# Patient Record
Sex: FEMALE | NOT HISPANIC OR LATINO | Employment: OTHER | URBAN - METROPOLITAN AREA
[De-identification: names, ages, dates, MRNs, and addresses within clinical notes are randomized per-mention and may not be internally consistent; named-entity substitution may affect disease eponyms.]

---

## 2017-03-20 ENCOUNTER — TRANSFERRED RECORDS (OUTPATIENT)
Dept: HEALTH INFORMATION MANAGEMENT | Facility: CLINIC | Age: 72
End: 2017-03-20

## 2017-12-14 ENCOUNTER — PRE VISIT (OUTPATIENT)
Dept: CARDIOLOGY | Facility: CLINIC | Age: 72
End: 2017-12-14

## 2017-12-19 ENCOUNTER — OFFICE VISIT (OUTPATIENT)
Dept: CARDIOLOGY | Facility: CLINIC | Age: 72
End: 2017-12-19
Attending: INTERNAL MEDICINE
Payer: COMMERCIAL

## 2017-12-19 VITALS
HEIGHT: 61 IN | SYSTOLIC BLOOD PRESSURE: 118 MMHG | BODY MASS INDEX: 24.35 KG/M2 | DIASTOLIC BLOOD PRESSURE: 76 MMHG | HEART RATE: 60 BPM | WEIGHT: 129 LBS

## 2017-12-19 DIAGNOSIS — I25.10 CORONARY ARTERY DISEASE INVOLVING NATIVE CORONARY ARTERY OF NATIVE HEART WITHOUT ANGINA PECTORIS: Primary | ICD-10-CM

## 2017-12-19 DIAGNOSIS — I21.4 NSTEMI (NON-ST ELEVATED MYOCARDIAL INFARCTION) (H): ICD-10-CM

## 2017-12-19 DIAGNOSIS — I10 ESSENTIAL HYPERTENSION: ICD-10-CM

## 2017-12-19 DIAGNOSIS — Z95.5 S/P RIGHT CORONARY ARTERY (RCA) STENT PLACEMENT: ICD-10-CM

## 2017-12-19 DIAGNOSIS — E78.00 PURE HYPERCHOLESTEROLEMIA: ICD-10-CM

## 2017-12-19 PROCEDURE — 99214 OFFICE O/P EST MOD 30 MIN: CPT | Performed by: INTERNAL MEDICINE

## 2017-12-19 NOTE — LETTER
"12/19/2017      Minesh Conte MD  [facility] [address]  [facility] [address]      RE: Malinda M Chapincito       Dear Colleague,    I had the pleasure of seeing Malinda Beckham in the [facility].    PRIMARY CARE PHYSICIAN:  Dr. Minesh Conte.      HISTORY OF PRESENT ILLNESS:  I again had the pleasure of seeing your patient, Malinda Beckham, at [facility] for evaluation of coronary artery disease and intermittent chest discomfort.  The patient was hospitalized at [facility] 10/18/2013 for chest discomfort and throat burning.  Coronary angiography demonstrated a 95% proximal right coronary artery stenosis that was stented using a drug-eluting stent.  A 60%-70% long proximal LAD stenosis and a 50% mid-circumflex artery stenosis were untreated.  Nuclear stress tests performed in 2014 and 2015 demonstrated no evidence of ischemia or infarction with well-maintained ejection fraction.  The patient continues to have intermittent chest discomfort and was hospitalized in Observation on 10/23/2015.  Her nuclear stress test was normal and a 24-hour ambulatory blood pressure monitor and Holter monitor were performed.  The patient had PVCs and PACs but no significant tachy or bradyarrhythmias.  A 24-hour ambulatory blood pressure monitor showed approximately 20% of the systolic readings elevated and 80% normal.  The patient's chest discomfort continues to be 30 seconds or less.  She denies surgery or hospitalizations since I saw her 1 year ago.  She has taken sublingual nitroglycerin for \"a fast heart rate.\"  This has occurred twice with heart rates around 100 beats per minute.  This lasts 30 minutes.  It seems to be relieved with sitting down and the nitroglycerin.  She is not able to exercise due to arthritis.      PHYSICAL EXAMINATION:  As listed below.      LABORATORY:  From Dr. Conte's office includes on 03/20/2017 total " cholesterol 145, triglycerides 74, HDL 97, LDL 43, VLDL 15 with a ratio of 1.7.  Glucose 91.      ASSESSMENT:   1.  Malinda Beckham is a pleasant 72-year-old female status post non-ST elevation inferior wall myocardial infarction in 10/2013.  Her nuclear stress test the next 2 years were normal with normal ejection fraction.  We will continue to monitor.  I would not perform another stress test on her for 2-3 more years if she remains stable.  Her chest pain is undoubtedly noncardiac as it occurs at rest.   2.  The patient's blood pressure is normal.  I have made no medication changes.   3.  Fasting lipid profile which is under excellent control and followed by her primary care physician.      It is my pleasure to assist in the care of Malinda Beckham.  All her questions were answered to her satisfaction.       Outpatient Encounter Prescriptions as of 12/19/2017   Medication Sig Dispense Refill     cycloSPORINE (RESTASIS) 0.05 % ophthalmic emulsion Place 1 drop into both eyes 2 times daily       Naproxen Sodium (ALEVE PO) Take 550 mg by mouth        Biotin 1 MG CAPS Take by mouth daily       cyanocobalamin 1000 MCG SUBL Place 1,000 mcg under the tongue daily       Ascorbic Acid (VITAMIN C PO) Take 500 mg by mouth daily        VITAMIN E PO Take 400 Units by mouth daily        Omega-3 Fatty Acids (OMEGA-3 FISH OIL PO) Take 1 g by mouth daily        Coenzyme Q10 (COQ-10 PO) Take 1 tablet by mouth daily       melatonin 5 MG tablet Take 5 mg by mouth every evening       MAGNESIUM PO Take 1 tablet by mouth daily       CALCIUM-VITAMIN D PO Take 1 tablet by mouth daily       nitroglycerin (NITROSTAT) 0.4 MG SL tablet Place 1 tablet (0.4 mg) under the tongue every 5 minutes as needed for chest pain 25 tablet 0     metoprolol (LOPRESSOR) 50 MG tablet Take 25 mg by mouth 2 times daily  60 tablet      atorvastatin (LIPITOR) 40 MG tablet Take 40 mg by mouth every evening        aspirin 81 MG EC tablet Take 1 tablet (81 mg) by mouth  daily 30 tablet 12     [DISCONTINUED] naproxen sodium (ANAPROX) 220 MG tablet Take 440 mg by mouth daily        [DISCONTINUED] prednisoLONE acetate (PRED FORTE) 1 % ophthalmic suspension Place 1 drop into the right eye 4 times daily 1 Bottle      No facility-administered encounter medications on file as of 12/19/2017.      Again, thank you for allowing me to participate in the care of your patient.      Sincerely,    Michael Wright MD     [facility]

## 2017-12-19 NOTE — PROGRESS NOTES
HPI and Plan:   See dictation:[id number]    Orders Placed This Encounter   Procedures     Follow-Up with Cardiologist       No orders of the defined types were placed in this encounter.      Medications Discontinued During This Encounter   Medication Reason     naproxen sodium (ANAPROX) 220 MG tablet Medication Reconciliation Clean Up     prednisoLONE acetate (PRED FORTE) 1 % ophthalmic suspension Not filled/taken by Patient         Encounter Diagnoses   Name Primary?     Coronary artery disease involving native coronary artery of native heart without angina pectoris      NSTEMI (non-ST elevated myocardial infarction) (H)      Essential hypertension      Pure hypercholesterolemia        CURRENT MEDICATIONS:  Current Outpatient Prescriptions   Medication Sig Dispense Refill     cycloSPORINE (RESTASIS) 0.05 % ophthalmic emulsion Place 1 drop into both eyes 2 times daily       Naproxen Sodium (ALEVE PO) Take 550 mg by mouth        Biotin 1 MG CAPS Take by mouth daily       cyanocobalamin 1000 MCG SUBL Place 1,000 mcg under the tongue daily       Ascorbic Acid (VITAMIN C PO) Take 500 mg by mouth daily        VITAMIN E PO Take 400 Units by mouth daily        Omega-3 Fatty Acids (OMEGA-3 FISH OIL PO) Take 1 g by mouth daily        Coenzyme Q10 (COQ-10 PO) Take 1 tablet by mouth daily       melatonin 5 MG tablet Take 5 mg by mouth every evening       MAGNESIUM PO Take 1 tablet by mouth daily       CALCIUM-VITAMIN D PO Take 1 tablet by mouth daily       nitroglycerin (NITROSTAT) 0.4 MG SL tablet Place 1 tablet (0.4 mg) under the tongue every 5 minutes as needed for chest pain 25 tablet 0     metoprolol (LOPRESSOR) 50 MG tablet Take 25 mg by mouth 2 times daily  60 tablet      atorvastatin (LIPITOR) 40 MG tablet Take 40 mg by mouth every evening        aspirin 81 MG EC tablet Take 1 tablet (81 mg) by mouth daily 30 tablet 12       ALLERGIES   No Known Allergies    PAST MEDICAL HISTORY:  Past Medical History:   Diagnosis Date      "Coronary artery disease     stent 2013     Hyperlipidaemia      Hypertension      Myocardial infarction     NSTEMI 2013     Osteoarthritis of knee      Pure hypercholesterolemia        PAST SURGICAL HISTORY:  Past Surgical History:   Procedure Laterality Date     HC LEFT HEART CATHETERIZATION  10/18/13    Stent to RCA     lens implant surgery         FAMILY HISTORY:  Family History   Problem Relation Age of Onset     CEREBROVASCULAR DISEASE Mother 79     possible MI     Hypertension Mother      C.A.D. Father 59     MI     C.A.D. Brother      2 stents     C.A.D. Other      MI     CANCER Other 69     lung       SOCIAL HISTORY:  Social History     Social History     Marital status:      Spouse name: N/A     Number of children: N/A     Years of education: N/A     Social History Main Topics     Smoking status: Former Smoker     Smokeless tobacco: Never Used      Comment: 1967     Alcohol use 0.0 oz/week     0 Standard drinks or equivalent per week      Comment: 1-2 a day.     Drug use: No     Sexual activity: Not Asked     Other Topics Concern     Caffeine Concern No     1-2  mug mornings     Sleep Concern No     does not sleep well     Special Diet No     Exercise No     due to knees     Seat Belt Yes     Social History Narrative       Review of Systems:  Skin:  Positive for rash     Eyes:  Positive for glasses    ENT:  Positive for tinnitus    Respiratory:  Negative       Cardiovascular:    palpitations;Positive for;chest pain    Gastroenterology: Negative      Genitourinary:  Positive for urinary frequency    Musculoskeletal:  Positive for joint pain    Neurologic:  Positive for numbness or tingling of hands    Psychiatric:  Positive for sleep disturbances    Heme/Lymph/Imm:  Negative      Endocrine:  Negative        Physical Exam:  Vitals: /76 (BP Location: Right arm, Patient Position: Sitting, Cuff Size: Adult Regular)  Pulse 60  Ht 1.537 m (5' 0.5\")  Wt 58.5 kg (129 lb)  Breastfeeding? No  BMI " 24.78 kg/m2    Constitutional:  cooperative, alert and oriented, well developed, well nourished, in no acute distress        Skin:  warm and dry to the touch, no apparent skin lesions or masses noted          Head:  normocephalic, no masses or lesions        Eyes:  pupils equal and round, conjunctivae and lids unremarkable, sclera white, no xanthalasma, EOMS intact, no nystagmus        Lymph:      ENT:  no pallor or cyanosis, dentition good        Neck:  carotid pulses are full and equal bilaterally, JVP normal, no carotid bruit        Respiratory:  normal symmetry;normal respiratory excursion    fewe rales in left base otherwise clear to auscultation    Cardiac: regular rhythm;normal S1 and S2;apical impulse not displaced   S4   systolic ejection murmur;grade 1;LLSB;radiation to the RUSB        pulses full and equal, no bruits auscultated                                        GI:  abdomen soft, non-tender, BS normoactive, no mass, no HSM, no bruits        Extremities and Muscular Skeletal:  no edema arthritic deformities of LE            Neurological:  affect appropriate   walks with a cane    Psych:  Alert and Oriented x 3        CC  Michael Wright MD  [address]  [place] [address]

## 2017-12-19 NOTE — MR AVS SNAPSHOT
"              After Visit Summary   12/19/2017    Malinda Beckham    MRN: [id number]           Patient Information     Date Of Birth          1945        Visit Information        Provider Department      12/19/2017 3:15 PM Michael Wright MD [facility]        Today's Diagnoses     Coronary artery disease involving native coronary artery of native heart without angina pectoris        NSTEMI (non-ST elevated myocardial infarction) (H)        Essential hypertension        Pure hypercholesterolemia           Follow-ups after your visit        Additional Services     Follow-Up with Cardiologist                 Future tests that were ordered for you today     Open Future Orders        Priority Expected Expires Ordered    Follow-Up with Cardiologist Routine 12/19/2018 12/20/2018 12/19/2017            Who to contact     If you have questions or need follow up information about today's clinic visit or your schedule please contact [facility] directly at [phone].  Normal or non-critical lab and imaging results will be communicated to you by [employer], letter or phone within 4 business days after the clinic has received the results. If you do not hear from us within 7 days, please contact the clinic through [employer] or phone. If you have a critical or abnormal lab result, we will notify you by phone as soon as possible.  Submit refill requests through [employer] or call your pharmacy and they will forward the refill request to us. Please allow 3 business days for your refill to be completed.          Additional Information About Your Visit        MyChart Information     [employer] lets you send messages to your doctor, view your test results, renew your prescriptions, schedule appointments and more. To sign up, go to [email or web address] . Click on \"Log in\" on the left side of the screen, which will take you to the Welcome page. Then " "click on \"Sign up Now\" on the right side of the page.     You will be asked to enter the access code listed below, as well as some personal information. Please follow the directions to create your username and password.     Your access code is: [email or web address]  Expires: 3/19/2018  3:47 PM     Your access code will  in 90 days. If you need help or a new code, please call your [place] clinic or [phone].        Care EveryWhere ID     This is your Care EveryWhere ID. This could be used by other organizations to access your [place] medical records  [id number]        Your Vitals Were     Pulse Height Breastfeeding? BMI (Body Mass Index)          60 1.537 m (5' 0.5\") No 24.78 kg/m2         Blood Pressure from Last 3 Encounters:   17 118/76   16 138/65   12/07/15 148/65    Weight from Last 3 Encounters:   17 58.5 kg (129 lb)   16 59.9 kg (132 lb)   12/07/15 59.4 kg (131 lb)              We Performed the Following     Follow-Up with Cardiologist        Primary Care Provider Office Phone # Fax #    Minesh Conte [phone]       [facility] [address] [facility] [address]        Equal Access to Services     NIC BENNETT : Hadii aad ku hadasho Soyashira, waaxda luqadaha, qaybta kaalmada adeshakeelyada, luz ornelas. So [facility] [phone].    ATENCIÓN: Si habla español, tiene a nicholson disposición servicios gratuitos de asistencia lingüística. Tian al [phone].    We comply with applicable federal civil rights laws and [place] laws. We do not discriminate on the basis of race, color, national origin, age, disability, sex, sexual orientation, or gender identity.            Thank you!     Thank you for choosing [facility]  for your care. Our goal is always to provide you with excellent care. Hearing back from our patients is one way we can continue to improve our services. " Please take a few minutes to complete the written survey that you may receive in the mail after your visit with us. Thank you!             Your Updated Medication List - Protect others around you: Learn how to safely use, store and throw away your medicines at www.disposemymeds.org.          This list is accurate as of: 12/19/17  3:47 PM.  Always use your most recent med list.                   Brand Name Dispense Instructions for use Diagnosis    ALEVE PO      Take 550 mg by mouth        aspirin 81 MG EC tablet     30 tablet    Take 1 tablet (81 mg) by mouth daily    CAD (coronary artery disease)       atorvastatin 40 MG tablet    LIPITOR     Take 40 mg by mouth every evening        Biotin 1 MG Caps      Take by mouth daily        CALCIUM-VITAMIN D PO      Take 1 tablet by mouth daily        COQ-10 PO      Take 1 tablet by mouth daily        cyanocobalamin 1000 MCG Subl sublingual tablet      Place 1,000 mcg under the tongue daily        MAGNESIUM PO      Take 1 tablet by mouth daily        melatonin 5 MG tablet      Take 5 mg by mouth every evening        metoprolol 50 MG tablet    LOPRESSOR    60 tablet    Take 25 mg by mouth 2 times daily        nitroGLYcerin 0.4 MG sublingual tablet    NITROSTAT    25 tablet    Place 1 tablet (0.4 mg) under the tongue every 5 minutes as needed for chest pain    Chest discomfort       OMEGA-3 FISH OIL PO      Take 1 g by mouth daily        RESTASIS 0.05 % ophthalmic emulsion   Generic drug:  cycloSPORINE      Place 1 drop into both eyes 2 times daily        VITAMIN C PO      Take 500 mg by mouth daily        VITAMIN E PO      Take 400 Units by mouth daily

## 2017-12-20 NOTE — PROGRESS NOTES
"PRIMARY CARE PHYSICIAN:  Dr. Minesh Conte.      HISTORY OF PRESENT ILLNESS:  I again had the pleasure of seeing your patient, Malinda Beckham, at [facility] for evaluation of coronary artery disease and intermittent chest discomfort.  The patient was hospitalized at [facility] 10/18/2013 for chest discomfort and throat burning.  Coronary angiography demonstrated a 95% proximal right coronary artery stenosis that was stented using a drug-eluting stent.  A 60%-70% long proximal LAD stenosis and a 50% mid-circumflex artery stenosis were untreated.  Nuclear stress tests performed in 2014 and 2015 demonstrated no evidence of ischemia or infarction with well-maintained ejection fraction.  The patient continues to have intermittent chest discomfort and was hospitalized in Observation on 10/23/2015.  Her nuclear stress test was normal and a 24-hour ambulatory blood pressure monitor and Holter monitor were performed.  The patient had PVCs and PACs but no significant tachy or bradyarrhythmias.  A 24-hour ambulatory blood pressure monitor showed approximately 20% of the systolic readings elevated and 80% normal.  The patient's chest discomfort continues to be 30 seconds or less.  She denies surgery or hospitalizations since I saw her 1 year ago.  She has taken sublingual nitroglycerin for \"a fast heart rate.\"  This has occurred twice with heart rates around 100 beats per minute.  This lasts 30 minutes.  It seems to be relieved with sitting down and the nitroglycerin.  She is not able to exercise due to arthritis.      PHYSICAL EXAMINATION:  As listed below.      LABORATORY:  From Dr. Conte's office includes on 03/20/2017 total cholesterol 145, triglycerides 74, HDL 97, LDL 43, VLDL 15 with a ratio of 1.7.  Glucose 91.      ASSESSMENT:   1.  Malinda Beckham is a pleasant 72-year-old female status post non-ST elevation inferior wall myocardial infarction in 10/2013.  Her nuclear stress test " the next 2 years were normal with normal ejection fraction.  We will continue to monitor.  I would not perform another stress test on her for 2-3 more years if she remains stable.  Her chest pain is undoubtedly noncardiac as it occurs at rest.   2.  The patient's blood pressure is normal.  I have made no medication changes.   3.  Fasting lipid profile which is under excellent control and followed by her primary care physician.      It is my pleasure to assist in the care of Malinda Rodríguez.  All her questions were answered to her satisfaction.      Kelly Gomes MD       cc:   [place] [address]         KELLY GOMES MD, [facility]             D: 2017 15:56   T: 2017 20:41   MT: TARAH      Name:     MALINDA RODRÍGUEZ   MRN:      [id number]        Account:      [id number]   :      1945           Service Date: 2017      Document: [id number]

## 2017-12-29 ENCOUNTER — TELEPHONE (OUTPATIENT)
Dept: CARDIOLOGY | Facility: CLINIC | Age: 72
End: 2017-12-29

## 2017-12-29 NOTE — TELEPHONE ENCOUNTER
Patient called and stated she blew ut her knee yesterday and went to the urgent care.  She was started on Meloxicam but was instructed to call her PMD or cardiologist prior to starting. Informed her Dr. Wright is out of the office this afternoon and suggested she contact her PMD to verify it is ok to start the medication. She stated her understanding. No further questions.

## 2018-01-09 ENCOUNTER — TELEPHONE (OUTPATIENT)
Dept: CARDIOLOGY | Facility: CLINIC | Age: 73
End: 2018-01-09

## 2018-01-09 NOTE — TELEPHONE ENCOUNTER
Patient called and stated she needs to have a knee replacement.  The surgeon called her today and informed her he had a cancellation next week to complete the surgery.  She is wondering if she could use Dr. Wright's 12/19/17 OV as her pre-op visit.     Per Dr. Wright: 1Theo Beckham is a pleasant 72-year-old female status post non-ST elevation inferior wall myocardial infarction in 10/2013.  Her nuclear stress test the next 2 years were normal with normal ejection fraction.  We will continue to monitor.  I would not perform another stress test on her for 2-3 more years if she remains stable.  Her chest pain is undoubtedly noncardiac as it occurs at rest.     Will route to Dr. Wright to see if it is ok to use 12/19/17 as a pre-op visit and if he would want any further testing prior.

## 2018-01-09 NOTE — TELEPHONE ENCOUNTER
Patient called and left a message. Called patient back and she did not answer. Left a message to call team 4 with the direct number.

## 2018-02-13 ENCOUNTER — TRANSFERRED RECORDS (OUTPATIENT)
Dept: HEALTH INFORMATION MANAGEMENT | Facility: CLINIC | Age: 73
End: 2018-02-13

## 2018-02-26 ASSESSMENT — MIFFLIN-ST. JEOR: SCORE: 1055.14

## 2018-03-01 ENCOUNTER — ANESTHESIA - HEALTHEAST (OUTPATIENT)
Dept: SURGERY | Facility: CLINIC | Age: 73
End: 2018-03-01

## 2018-03-02 ENCOUNTER — HOME CARE/HOSPICE - HEALTHEAST (OUTPATIENT)
Dept: HOME HEALTH SERVICES | Facility: HOME HEALTH | Age: 73
End: 2018-03-02

## 2018-03-02 ENCOUNTER — SURGERY - HEALTHEAST (OUTPATIENT)
Dept: SURGERY | Facility: CLINIC | Age: 73
End: 2018-03-02

## 2018-03-02 ASSESSMENT — MIFFLIN-ST. JEOR: SCORE: 991.64

## 2018-04-17 LAB
ALBUMIN SERPL-MCNC: 4.1 G/DL (ref 3.4–5)
ALP SERPL-CCNC: 99 U/L (ref 0–116)
ALT SERPL-CCNC: 28 U/L (ref 0–78)
ANION GAP SERPL CALCULATED.3IONS-SCNC: NORMAL MMOL/L
AST SERPL-CCNC: 27 U/L (ref 0–37)
BILIRUB SERPL-MCNC: 0.51 MG/DL (ref 0.2–1)
BUN SERPL-MCNC: 17 MG/DL (ref 7–18)
CALCIUM SERPL-MCNC: 9.1 MG/DL (ref 8.5–10.1)
CHLORIDE SERPLBLD-SCNC: 104 MMOL/L (ref 98–107)
CHOLEST SERPL-MCNC: 151 MG/DL (ref 0–200)
CO2 SERPL-SCNC: NORMAL MMOL/L
CREAT SERPL-MCNC: 0.69 MG/DL (ref 0.6–1.3)
ERYTHROCYTE [DISTWIDTH] IN BLOOD BY AUTOMATED COUNT: 13.3 % (ref 11.5–14.5)
GFR SERPL CREATININE-BSD FRML MDRD: NORMAL ML/MIN/1.73M2
GLUCOSE SERPL-MCNC: 92 MG/DL (ref 70–99)
HBA1C MFR BLD: 5.1 % (ref 4.8–5.6)
HCT VFR BLD AUTO: 40.2 % (ref 36–45)
HDLC SERPL-MCNC: 71 MG/DL (ref 40–96)
HEMOGLOBIN: 13.2 G/DL (ref 12–15.5)
LDLC SERPL CALC-MCNC: 48.6 MG/DL (ref 0–130)
MCH RBC QN AUTO: 30.2 PG (ref 26.5–33)
MCHC RBC AUTO-ENTMCNC: 32.8 G/DL (ref 31.5–36.5)
MCV RBC AUTO: 92 FL (ref 78–99)
NONHDLC SERPL-MCNC: NORMAL MG/DL
PLATELET # BLD AUTO: 290 10^9/L (ref 150–430)
POTASSIUM SERPL-SCNC: 4.4 MMOL/L (ref 3.5–5.1)
PROT SERPL-MCNC: 7.1 G/DL (ref 6.4–8.2)
RBC # BLD AUTO: 4.37 10^12/L (ref 4–5.2)
SODIUM SERPL-SCNC: 138 MMOL/L (ref 136–145)
TRIGL SERPL-MCNC: 157 MG/DL (ref 30–200)
TSH SERPL-ACNC: 2.49 MCU/ML (ref 0.36–3.74)
WBC # BLD AUTO: 10.1 10^9/L (ref 4–10)

## 2018-12-13 ENCOUNTER — DOCUMENTATION ONLY (OUTPATIENT)
Dept: CARDIOLOGY | Facility: CLINIC | Age: 73
End: 2018-12-13

## 2018-12-17 ENCOUNTER — OFFICE VISIT (OUTPATIENT)
Dept: CARDIOLOGY | Facility: CLINIC | Age: 73
End: 2018-12-17
Payer: COMMERCIAL

## 2018-12-17 VITALS
BODY MASS INDEX: 26.5 KG/M2 | HEART RATE: 60 BPM | WEIGHT: 135 LBS | DIASTOLIC BLOOD PRESSURE: 72 MMHG | HEIGHT: 60 IN | SYSTOLIC BLOOD PRESSURE: 161 MMHG

## 2018-12-17 DIAGNOSIS — Z95.5 S/P RIGHT CORONARY ARTERY (RCA) STENT PLACEMENT: ICD-10-CM

## 2018-12-17 DIAGNOSIS — I10 ESSENTIAL HYPERTENSION: ICD-10-CM

## 2018-12-17 DIAGNOSIS — E78.00 PURE HYPERCHOLESTEROLEMIA: ICD-10-CM

## 2018-12-17 DIAGNOSIS — I25.10 CORONARY ARTERY DISEASE INVOLVING NATIVE CORONARY ARTERY OF NATIVE HEART WITHOUT ANGINA PECTORIS: Primary | ICD-10-CM

## 2018-12-17 DIAGNOSIS — I21.4 NSTEMI (NON-ST ELEVATED MYOCARDIAL INFARCTION) (H): ICD-10-CM

## 2018-12-17 PROCEDURE — 99213 OFFICE O/P EST LOW 20 MIN: CPT | Performed by: INTERNAL MEDICINE

## 2018-12-17 RX ORDER — FOLIC ACID/MULTIVIT,IRON,MINER 0.4MG-18MG
TABLET ORAL
COMMUNITY
End: 2021-01-14

## 2018-12-17 ASSESSMENT — MIFFLIN-ST. JEOR: SCORE: 1028.86

## 2018-12-17 NOTE — LETTER
12/17/2018      Minesh Conte MD  [facility]   [address]  [facility] [address]      RE: Malinda Beckham       Dear Colleague,    I had the pleasure of seeing Malinda Beckham in the [facility].    Service Date: 12/17/2018      PRIMARY CARE PHYSICIAN:  Dr. Minesh Conte.       HISTORY OF PRESENT ILLNESS:  I again had the pleasure of seeing your patient, Malinda Beckham, at [facility] for evaluation of coronary artery disease and intermittent chest discomfort.  The patient was hospitalized at [facility] 10/18/2013 for chest discomfort and throat burning.  Coronary angiography demonstrated 95% proximal right coronary artery stenosis that was stented using a drug-eluting stent.  A 60%-70% proximal LAD stenosis and a 50% mid-circumflex artery stenosis were left untreated.  Nuclear stress test in 2014 and 2015 demonstrated no evidence of ischemia or infarction with well-maintained ejection fraction.  The patient denies any recent chest symptoms.  A 24-hour ambulatory blood pressure monitor and Holter monitor performed in 2015 showed PVCs and PACs but no tachy or bradyarrhythmias.  The 24-hour ambulatory blood pressure monitor showed approximately 20% of the systolic readings elevated and 80% normal.  For the most part the patient states her blood pressures at home have been under excellent control.  She underwent a right total knee arthroplasty in February of this year.  She is still limited by significant left lower extremity arthritis.  She has increased her aspirin to 162 mg each day at bedtime because of arthritic pain.      PHYSICAL EXAMINATION:  As listed below.      Lipids on 04/17/2018 include total cholesterol 151, HDL 71, LDL 49 and triglycerides 157.          ASSESSMENT:   1.  Malinda Beckham is a pleasant 73-year-old female status post non-ST elevation inferior wall myocardial infarction in 10/2013.  Her nuclear stress  test in  and  were normal despite ongoing intermittent chest pain.  We will continue to monitor.  I would not perform another stress test without recurrent significant anginal symptoms.  Previous chest pain was undoubtedly noncardiac.   2.  The patient's systolic blood pressure is elevated.  I have asked her to return to her primary care physician for further evaluation and treatment.   3.  Fasting lipid profile currently under excellent control and followed by her primary care physician.   4.  I again suggested to this patient that most of the over-the-counter medications she is taking are unnecessary and unproven.  The patient has continued to doubt the science behind my suggestion that she discontinue these medications.      It is my pleasure to assist in the care of Malinda Rodríguez.  All her questions were answered to her satisfaction.  It is my intention to see her again in 1 year or earlier on a p.r.n. basis.  Her  was in attendance for today's visit.      Kelly Gomes MD       cc:   [place] [address]         KELLY GOMES MD, [facility]             D: 2018   T: 2018   MT: TARAH      Name:     MALINDA RODRÍGUEZ   MRN:      [id number]        Account:      [id number]   :      1945           Service Date: 2018      Document: [id number]           Outpatient Encounter Medications as of 2018   Medication Sig Dispense Refill     Ascorbic Acid (VITAMIN C PO) Take 500 mg by mouth daily        aspirin (ASA) 81 MG EC tablet Take 2 tablets (162 mg) by mouth At Bedtime 1 tablet 0     atorvastatin (LIPITOR) 40 MG tablet Take 40 mg by mouth every evening        Biotin 1 MG CAPS Take by mouth daily       CALCIUM-VITAMIN D PO Take 1 tablet by mouth daily       Coenzyme Q10 (COQ-10 PO) Take 1 tablet by mouth daily       cyanocobalamin 1000 MCG SUBL Place 1,000 mcg under the tongue daily        cycloSPORINE (RESTASIS) 0.05 % ophthalmic emulsion Place 1 drop into both eyes 2 times daily       Krill Oil 350 MG CAPS        MAGNESIUM PO Take 1 tablet by mouth daily       melatonin 5 MG tablet Take 5 mg by mouth every evening       metoprolol (LOPRESSOR) 50 MG tablet Take 25 mg by mouth 2 times daily  60 tablet      Naproxen Sodium (ALEVE PO) Take 550 mg by mouth        nitroglycerin (NITROSTAT) 0.4 MG SL tablet Place 1 tablet (0.4 mg) under the tongue every 5 minutes as needed for chest pain 25 tablet 0     UNABLE TO FIND Vit D3 25 mg once a day       VITAMIN E PO Take 400 Units by mouth daily        [DISCONTINUED] aspirin 81 MG EC tablet Take 1 tablet (81 mg) by mouth daily 30 tablet 12     [DISCONTINUED] Omega-3 Fatty Acids (OMEGA-3 FISH OIL PO) Take 1 g by mouth daily        No facility-administered encounter medications on file as of 12/17/2018.        Again, thank you for allowing me to participate in the care of your patient.      Sincerely,    Michael Wright MD     [facility]

## 2018-12-17 NOTE — PROGRESS NOTES
HPI and Plan:   See dictation:[id number]    Orders Placed This Encounter   Procedures     Follow-Up with Cardiologist       Orders Placed This Encounter   Medications     Krill Oil 350 MG CAPS     UNABLE TO FIND     Sig: Vit D3 25 mg once a day     aspirin (ASA) 81 MG EC tablet     Sig: Take 2 tablets (162 mg) by mouth At Bedtime     Dispense:  1 tablet     Refill:  0       Medications Discontinued During This Encounter   Medication Reason     aspirin 81 MG EC tablet      Omega-3 Fatty Acids (OMEGA-3 FISH OIL PO)          Encounter Diagnoses   Name Primary?     Coronary artery disease involving native coronary artery of native heart without angina pectoris Yes     NSTEMI (non-ST elevated myocardial infarction) (H)      S/P right coronary artery (RCA) stent placement      Essential hypertension      Pure hypercholesterolemia        CURRENT MEDICATIONS:  Current Outpatient Medications   Medication Sig Dispense Refill     Ascorbic Acid (VITAMIN C PO) Take 500 mg by mouth daily        aspirin (ASA) 81 MG EC tablet Take 2 tablets (162 mg) by mouth At Bedtime 1 tablet 0     atorvastatin (LIPITOR) 40 MG tablet Take 40 mg by mouth every evening        Biotin 1 MG CAPS Take by mouth daily       CALCIUM-VITAMIN D PO Take 1 tablet by mouth daily       Coenzyme Q10 (COQ-10 PO) Take 1 tablet by mouth daily       cyanocobalamin 1000 MCG SUBL Place 1,000 mcg under the tongue daily       cycloSPORINE (RESTASIS) 0.05 % ophthalmic emulsion Place 1 drop into both eyes 2 times daily       Krill Oil 350 MG CAPS        MAGNESIUM PO Take 1 tablet by mouth daily       melatonin 5 MG tablet Take 5 mg by mouth every evening       metoprolol (LOPRESSOR) 50 MG tablet Take 25 mg by mouth 2 times daily  60 tablet      Naproxen Sodium (ALEVE PO) Take 550 mg by mouth        nitroglycerin (NITROSTAT) 0.4 MG SL tablet Place 1 tablet (0.4 mg) under the tongue every 5 minutes as needed for chest pain 25 tablet 0     UNABLE TO FIND Vit D3 25 mg once a day        VITAMIN E PO Take 400 Units by mouth daily          ALLERGIES   No Known Allergies    PAST MEDICAL HISTORY:  Past Medical History:   Diagnosis Date     Coronary artery disease     stent 2013     Coronary artery disease involving native coronary artery of native heart without angina pectoris 12/19/2016     Essential hypertension 12/19/2016     Myocardial infarction (H)     NSTEMI 2013     NSTEMI (non-ST elevated myocardial infarction) (H) 10/18/2013     Osteoarthritis of knee      Pure hypercholesterolemia      S/P right coronary artery (RCA) stent placement 12/19/2017       PAST SURGICAL HISTORY:  Past Surgical History:   Procedure Laterality Date     HC LEFT HEART CATHETERIZATION  10/18/13    Stent to RCA     lens implant surgery         FAMILY HISTORY:  Family History   Problem Relation Age of Onset     Cerebrovascular Disease Mother 79        possible MI     Hypertension Mother      C.A.D. Father 59        MI     C.A.D. Brother         2 stents     C.A.D. Other         MI     Cancer Other 69        lung       SOCIAL HISTORY:  Social History     Socioeconomic History     Marital status:      Spouse name: None     Number of children: None     Years of education: None     Highest education level: None   Social Needs     Financial resource strain: None     Food insecurity - worry: None     Food insecurity - inability: None     Transportation needs - medical: None     Transportation needs - non-medical: None   Occupational History     None   Tobacco Use     Smoking status: Former Smoker     Smokeless tobacco: Never Used     Tobacco comment: 1967   Substance and Sexual Activity     Alcohol use: Yes     Alcohol/week: 0.0 oz     Comment: 1-2 a day.     Drug use: No     Sexual activity: None   Other Topics Concern     Parent/sibling w/ CABG, MI or angioplasty before 65F 55M? Not Asked      Service Not Asked     Blood Transfusions Not Asked     Caffeine Concern No     Comment: 1-2  mug mornings      Occupational Exposure Not Asked     Hobby Hazards Not Asked     Sleep Concern No     Comment: does not sleep well     Stress Concern Not Asked     Weight Concern Not Asked     Special Diet No     Back Care Not Asked     Exercise No     Comment: due to knees     Bike Helmet Not Asked     Seat Belt Yes     Self-Exams Not Asked   Social History Narrative     None       Review of Systems:  Skin:  Negative for       Eyes:  Positive for visual blurring cataract removed  ENT:  Negative for      Respiratory:  Negative for       Cardiovascular:    palpitations;chest pain;Positive for palpitation and chest pain occ  Gastroenterology: Negative for      Genitourinary:  Negative for      Musculoskeletal:  Positive for joint pain knee surgery last March  Neurologic:  Positive for   right arm numbess and tingling  Psychiatric:  Positive for sleep disturbances    Heme/Lymph/Imm:  Negative      Endocrine:  Negative        Physical Exam:  Vitals: /72   Pulse 60   Ht 1.524 m (5')   Wt 61.2 kg (135 lb)   BMI 26.37 kg/m      Constitutional:  cooperative, alert and oriented, well developed, well nourished, in no acute distress        Skin:  warm and dry to the touch, no apparent skin lesions or masses noted          Head:  normocephalic, no masses or lesions        Eyes:  pupils equal and round, conjunctivae and lids unremarkable, sclera white, no xanthalasma, EOMS intact, no nystagmus        Lymph:      ENT:  no pallor or cyanosis, dentition good        Neck:  carotid pulses are full and equal bilaterally, JVP normal, no carotid bruit        Respiratory:  normal breath sounds, clear to auscultation, normal A-P diameter, normal symmetry, normal respiratory excursion, no use of accessory muscles    fewe rales in left base otherwise clear to auscultation    Cardiac: regular rhythm;normal S1 and S2;apical impulse not displaced   S4   systolic ejection murmur;grade 1;LLSB;radiation to the RUSB        pulses full and equal, no  bruits auscultated                                        GI:  abdomen soft, non-tender, BS normoactive, no mass, no HSM, no bruits        Extremities and Muscular Skeletal:  no edema arthritic deformities of LE            Neurological:  affect appropriate   walks with a cane    Psych:  Alert and Oriented x 3        CC  Minesh Vlad Conte MD  [facility]  [address] [place] [address]

## 2018-12-17 NOTE — LETTER
12/17/2018    Minesh Conte MD  [facility] [address]  [facility] [address]    RE: Malinda Beckham       Dear Colleague,    I had the pleasure of seeing Malinda Beckham in the [facility].    HPI and Plan:   See dictation:[id number]    Orders Placed This Encounter   Procedures     Follow-Up with Cardiologist       Orders Placed This Encounter   Medications     Krill Oil 350 MG CAPS     UNABLE TO FIND     Sig: Vit D3 25 mg once a day     aspirin (ASA) 81 MG EC tablet     Sig: Take 2 tablets (162 mg) by mouth At Bedtime     Dispense:  1 tablet     Refill:  0       Medications Discontinued During This Encounter   Medication Reason     aspirin 81 MG EC tablet      Omega-3 Fatty Acids (OMEGA-3 FISH OIL PO)          Encounter Diagnoses   Name Primary?     Coronary artery disease involving native coronary artery of native heart without angina pectoris Yes     NSTEMI (non-ST elevated myocardial infarction) (H)      S/P right coronary artery (RCA) stent placement      Essential hypertension      Pure hypercholesterolemia        CURRENT MEDICATIONS:  Current Outpatient Medications   Medication Sig Dispense Refill     Ascorbic Acid (VITAMIN C PO) Take 500 mg by mouth daily        aspirin (ASA) 81 MG EC tablet Take 2 tablets (162 mg) by mouth At Bedtime 1 tablet 0     atorvastatin (LIPITOR) 40 MG tablet Take 40 mg by mouth every evening        Biotin 1 MG CAPS Take by mouth daily       CALCIUM-VITAMIN D PO Take 1 tablet by mouth daily       Coenzyme Q10 (COQ-10 PO) Take 1 tablet by mouth daily       cyanocobalamin 1000 MCG SUBL Place 1,000 mcg under the tongue daily       cycloSPORINE (RESTASIS) 0.05 % ophthalmic emulsion Place 1 drop into both eyes 2 times daily       Krill Oil 350 MG CAPS        MAGNESIUM PO Take 1 tablet by mouth daily       melatonin 5 MG tablet Take 5 mg by mouth every evening       metoprolol (LOPRESSOR) 50 MG tablet Take 25 mg by mouth 2 times daily   60 tablet      Naproxen Sodium (ALEVE PO) Take 550 mg by mouth        nitroglycerin (NITROSTAT) 0.4 MG SL tablet Place 1 tablet (0.4 mg) under the tongue every 5 minutes as needed for chest pain 25 tablet 0     UNABLE TO FIND Vit D3 25 mg once a day       VITAMIN E PO Take 400 Units by mouth daily          ALLERGIES   No Known Allergies    PAST MEDICAL HISTORY:  Past Medical History:   Diagnosis Date     Coronary artery disease     stent 2013     Coronary artery disease involving native coronary artery of native heart without angina pectoris 12/19/2016     Essential hypertension 12/19/2016     Myocardial infarction (H)     NSTEMI 2013     NSTEMI (non-ST elevated myocardial infarction) (H) 10/18/2013     Osteoarthritis of knee      Pure hypercholesterolemia      S/P right coronary artery (RCA) stent placement 12/19/2017       PAST SURGICAL HISTORY:  Past Surgical History:   Procedure Laterality Date     HC LEFT HEART CATHETERIZATION  10/18/13    Stent to RCA     lens implant surgery         FAMILY HISTORY:  Family History   Problem Relation Age of Onset     Cerebrovascular Disease Mother 79        possible MI     Hypertension Mother      C.A.D. Father 59        MI     C.A.D. Brother         2 stents     C.A.D. Other         MI     Cancer Other 69        lung       SOCIAL HISTORY:  Social History     Socioeconomic History     Marital status:      Spouse name: None     Number of children: None     Years of education: None     Highest education level: None   Social Needs     Financial resource strain: None     Food insecurity - worry: None     Food insecurity - inability: None     Transportation needs - medical: None     Transportation needs - non-medical: None   Occupational History     None   Tobacco Use     Smoking status: Former Smoker     Smokeless tobacco: Never Used     Tobacco comment: 1967   Substance and Sexual Activity     Alcohol use: Yes     Alcohol/week: 0.0 oz     Comment: 1-2 a day.     Drug use:  No     Sexual activity: None   Other Topics Concern     Parent/sibling w/ CABG, MI or angioplasty before 65F 55M? Not Asked      Service Not Asked     Blood Transfusions Not Asked     Caffeine Concern No     Comment: 1-2  mug mornings     Occupational Exposure Not Asked     Hobby Hazards Not Asked     Sleep Concern No     Comment: does not sleep well     Stress Concern Not Asked     Weight Concern Not Asked     Special Diet No     Back Care Not Asked     Exercise No     Comment: due to knees     Bike Helmet Not Asked     Seat Belt Yes     Self-Exams Not Asked   Social History Narrative     None       Review of Systems:  Skin:  Negative for       Eyes:  Positive for visual blurring cataract removed  ENT:  Negative for      Respiratory:  Negative for       Cardiovascular:    palpitations;chest pain;Positive for palpitation and chest pain occ  Gastroenterology: Negative for      Genitourinary:  Negative for      Musculoskeletal:  Positive for joint pain knee surgery last March  Neurologic:  Positive for   right arm numbess and tingling  Psychiatric:  Positive for sleep disturbances    Heme/Lymph/Imm:  Negative      Endocrine:  Negative        Physical Exam:  Vitals: /72   Pulse 60   Ht 1.524 m (5')   Wt 61.2 kg (135 lb)   BMI 26.37 kg/m       Constitutional:  cooperative, alert and oriented, well developed, well nourished, in no acute distress        Skin:  warm and dry to the touch, no apparent skin lesions or masses noted          Head:  normocephalic, no masses or lesions        Eyes:  pupils equal and round, conjunctivae and lids unremarkable, sclera white, no xanthalasma, EOMS intact, no nystagmus        Lymph:      ENT:  no pallor or cyanosis, dentition good        Neck:  carotid pulses are full and equal bilaterally, JVP normal, no carotid bruit        Respiratory:  normal breath sounds, clear to auscultation, normal A-P diameter, normal symmetry, normal respiratory excursion, no use of  accessory muscles    fewe rales in left base otherwise clear to auscultation    Cardiac: regular rhythm;normal S1 and S2;apical impulse not displaced   S4   systolic ejection murmur;grade 1;LLSB;radiation to the RUSB        pulses full and equal, no bruits auscultated                                        GI:  abdomen soft, non-tender, BS normoactive, no mass, no HSM, no bruits        Extremities and Muscular Skeletal:  no edema arthritic deformities of LE            Neurological:  affect appropriate   walks with a cane    Psych:  Alert and Oriented x 3        CC  [place] [address]                Thank you for allowing me to participate in the care of your patient.      Sincerely,     Michael Wright MD     [facility]    cc:   [place] [address]

## 2018-12-18 NOTE — PROGRESS NOTES
Service Date: 12/17/2018      PRIMARY CARE PHYSICIAN:  Dr. Minesh Conte.       HISTORY OF PRESENT ILLNESS:  I again had the pleasure of seeing your patient, Malinda Beckham, at [facility] for evaluation of coronary artery disease and intermittent chest discomfort.  The patient was hospitalized at [facility] 10/18/2013 for chest discomfort and throat burning.  Coronary angiography demonstrated 95% proximal right coronary artery stenosis that was stented using a drug-eluting stent.  A 60%-70% proximal LAD stenosis and a 50% mid-circumflex artery stenosis were left untreated.  Nuclear stress test in 2014 and 2015 demonstrated no evidence of ischemia or infarction with well-maintained ejection fraction.  The patient denies any recent chest symptoms.  A 24-hour ambulatory blood pressure monitor and Holter monitor performed in 2015 showed PVCs and PACs but no tachy or bradyarrhythmias.  The 24-hour ambulatory blood pressure monitor showed approximately 20% of the systolic readings elevated and 80% normal.  For the most part the patient states her blood pressures at home have been under excellent control.  She underwent a right total knee arthroplasty in February of this year.  She is still limited by significant left lower extremity arthritis.  She has increased her aspirin to 162 mg each day at bedtime because of arthritic pain.      PHYSICAL EXAMINATION:  As listed below.      Lipids on 04/17/2018 include total cholesterol 151, HDL 71, LDL 49 and triglycerides 157.      ASSESSMENT:   1.  Malinda Beckham is a pleasant 73-year-old female status post non-ST elevation inferior wall myocardial infarction in 10/2013.  Her nuclear stress test in 2014 and 2015 were normal despite ongoing intermittent chest pain.  We will continue to monitor.  I would not perform another stress test without recurrent significant anginal symptoms.  Previous chest pain was undoubtedly noncardiac.   2.  The  patient's systolic blood pressure is elevated.  I have asked her to return to her primary care physician for further evaluation and treatment.   3.  Fasting lipid profile currently under excellent control and followed by her primary care physician.   4.  I again suggested to this patient that most of the over-the-counter medications she is taking are unnecessary and unproven.  The patient has continued to doubt the science behind my suggestion that she discontinue these medications.      It is my pleasure to assist in the care of Malinda Rodríguez.  All her questions were answered to her satisfaction.  It is my intention to see her again in 1 year or earlier on a p.r.n. basis.  Her  was in attendance for today's visit.      Kelly Gomes MD       cc:   [place] [address]         KELLY GOMES MD, [facility]             D: 2018   T: 2018   MT: TARAH      Name:     MALINDA RODRÍGUEZ   MRN:      [id number]        Account:      [id number]   :      1945           Service Date: 2018      Document: [id number]

## 2019-08-29 ENCOUNTER — TRANSFERRED RECORDS (OUTPATIENT)
Dept: HEALTH INFORMATION MANAGEMENT | Facility: CLINIC | Age: 74
End: 2019-08-29

## 2019-12-18 DIAGNOSIS — I10 ESSENTIAL HYPERTENSION: ICD-10-CM

## 2019-12-18 DIAGNOSIS — I25.10 CORONARY ARTERY DISEASE INVOLVING NATIVE CORONARY ARTERY OF NATIVE HEART WITHOUT ANGINA PECTORIS: Primary | ICD-10-CM

## 2019-12-18 DIAGNOSIS — I21.4 NSTEMI (NON-ST ELEVATION MYOCARDIAL INFARCTION) (H): ICD-10-CM

## 2019-12-18 DIAGNOSIS — Z95.5 S/P CORONARY ARTERY STENT PLACEMENT: ICD-10-CM

## 2019-12-18 DIAGNOSIS — E78.00 PURE HYPERCHOLESTEROLEMIA: ICD-10-CM

## 2019-12-20 ENCOUNTER — OFFICE VISIT (OUTPATIENT)
Dept: CARDIOLOGY | Facility: CLINIC | Age: 74
End: 2019-12-20
Payer: COMMERCIAL

## 2019-12-20 VITALS
BODY MASS INDEX: 25.93 KG/M2 | DIASTOLIC BLOOD PRESSURE: 70 MMHG | HEART RATE: 64 BPM | HEIGHT: 61 IN | SYSTOLIC BLOOD PRESSURE: 136 MMHG

## 2019-12-20 DIAGNOSIS — Z95.5 S/P RIGHT CORONARY ARTERY (RCA) STENT PLACEMENT: ICD-10-CM

## 2019-12-20 DIAGNOSIS — E78.00 PURE HYPERCHOLESTEROLEMIA: ICD-10-CM

## 2019-12-20 DIAGNOSIS — I21.4 NSTEMI (NON-ST ELEVATED MYOCARDIAL INFARCTION) (H): ICD-10-CM

## 2019-12-20 DIAGNOSIS — I10 ESSENTIAL HYPERTENSION: ICD-10-CM

## 2019-12-20 DIAGNOSIS — I25.10 CORONARY ARTERY DISEASE INVOLVING NATIVE CORONARY ARTERY OF NATIVE HEART WITHOUT ANGINA PECTORIS: Primary | ICD-10-CM

## 2019-12-20 PROCEDURE — 99214 OFFICE O/P EST MOD 30 MIN: CPT | Performed by: INTERNAL MEDICINE

## 2019-12-20 NOTE — LETTER
12/20/2019      Minesh Conte MD  [facility] [address]  [facility] [address]      RE: Malinda Beckham       Dear Colleague,    I had the pleasure of seeing Malinda Beckham in the [facility].    Service Date: 12/20/2019      PRIMARY CARE PHYSICIAN:  Dr. Minesh Conte.      HISTORY OF PRESENT ILLNESS:  I again had the pleasure of seeing your patient, Malinda Beckham, at [facility] in [place] for evaluation of coronary artery disease and intermittent chest discomfort.  The patient was hospitalized at [facility] 10/18/2013 for chest discomfort and throat burning.  Coronary angiography demonstrated a 95% proximal right coronary artery stenosis that was stented using a drug-eluting stent.  A 60%-70% proximal LAD stenosis and a 50% mid-circumflex artery stenosis were left untreated.  Nuclear stress test in 2014 and 2015 demonstrated no evidence of ischemia or infarction with well-maintained ejection fraction.  The patient denies any recent chest symptoms.  A 24-hour ambulatory blood pressure monitor and Holter monitor performed in 2015 showed PVCs and PACs but no tachy or bradyarrhythmias.  A 24-hour ambulatory blood pressure monitor showed approximately 20% of the systolic readings were elevated and 80% normal.  The patient underwent a right total knee arthroplasty in 02/2018.  She is limited because of her left lower extremity arthritis.  She has increased her aspirin at 162 mg at bedtime for that arthritic pain.      LABORATORY TESTS:  On 08/29/2019 include TSH of 1.93.  Total cholesterol 161, triglycerides 119, HDL 85, LDL 52, VLDL 23.8 with a ratio of 1.9.  Sodium 140, potassium 4.4, chloride 104, bicarbonate 24, BUN 23, creatinine 0.88 and glucose 92.      PHYSICAL EXAMINATION:  As listed below.      ASSESSMENT:   1.  Malinda Beckham is a pleasant 74-year-old female status post non-ST elevation myocardial infarction in 10/2013.   Her nuclear stress test in  and  were normal despite ongoing chest pain.  We will continue to monitor.  I have not performed another stress test without recurrent significant anginal symptoms.  Some of her previous chest pain was undoubtedly noncardiac.   2.  The patient's systolic blood pressure remains borderline to mildly elevated.  I have again discussed a low-salt diet with her.  She will return to her primary care for treatment should this persist.   3.  Fasting lipid profile currently under excellent control and followed by her primary care physician.   4.  I have suggested to this patient in the past that most of her over-the-counter medications are unnecessary.  I did not discuss this today.   5.  Exam demonstrates ongoing systolic ejection murmur with an echocardiogram in  demonstrating normal aortic valve and 1+ tricuspid regurgitation.  Ejection fraction at that time was 55%-60% with moderate to severe inferolateral wall hypokinesis.      It is my pleasure to assist in the care of Malinda Rodríguez.  All her questions were answered to her satisfaction.  I will see her again in 1 year or earlier on a p.r.n. basis.      Kelly Gomes MD       cc:   [place] [address]         KELLY GOMES MD, [facility]             D: 2019   T: 2019   MT: TARAH      Name:     MALINDA RODRÍGUEZ   MRN:      [id number]        Account:      [id number]   :      1945           Service Date: 2019      Document: [id number]         Outpatient Encounter Medications as of 2019   Medication Sig Dispense Refill     Ascorbic Acid (VITAMIN C PO) Take 500 mg by mouth daily        aspirin (ASA) 81 MG EC tablet Take 2 tablets (162 mg) by mouth At Bedtime 1 tablet 0     atorvastatin (LIPITOR) 40 MG tablet Take 40 mg by mouth every evening        Biotin 1 MG CAPS Take by mouth daily       cyanocobalamin 1000 MCG SUBL Place  1,000 mcg under the tongue daily       cycloSPORINE (CYCLOSPORINE IN KLARITY) 0.1 % EMUL        MAGNESIUM PO Take 1 tablet by mouth daily       melatonin 5 MG tablet Take 5 mg by mouth every evening       metoprolol (LOPRESSOR) 50 MG tablet Take 25 mg by mouth 2 times daily  60 tablet      Naproxen Sodium (ALEVE PO) Take 550 mg by mouth 2 times daily (with meals)        nitroglycerin (NITROSTAT) 0.4 MG SL tablet Place 1 tablet (0.4 mg) under the tongue every 5 minutes as needed for chest pain 25 tablet 0     UNABLE TO FIND Vit D3 25 mg once a day       VITAMIN E PO Take 400 Units by mouth daily        CALCIUM-VITAMIN D PO Take 1 tablet by mouth daily       Coenzyme Q10 (COQ-10 PO) Take 1 tablet by mouth daily       cycloSPORINE (RESTASIS) 0.05 % ophthalmic emulsion Place 1 drop into both eyes 2 times daily       Krill Oil 350 MG CAPS        No facility-administered encounter medications on file as of 12/20/2019.        Again, thank you for allowing me to participate in the care of your patient.      Sincerely,    Michael Wright MD     [facility]

## 2019-12-20 NOTE — LETTER
12/20/2019    Minesh Conte MD  [facility] [address]  [facility] [address]    RE: Malinda Beckham       Dear Colleague,    I had the pleasure of seeing Malinda Beckham in the [facility].    HPI and Plan:   See dictation:[id number]    Orders Placed This Encounter   Procedures     Follow-Up with Cardiologist       Orders Placed This Encounter   Medications     cycloSPORINE (CYCLOSPORINE IN KLARITY) 0.1 % EMUL       There are no discontinued medications.      Encounter Diagnoses   Name Primary?     Coronary artery disease involving native coronary artery of native heart without angina pectoris Yes     NSTEMI (non-ST elevated myocardial infarction) (H)      S/P right coronary artery (RCA) stent placement      Essential hypertension      Pure hypercholesterolemia        CURRENT MEDICATIONS:  Current Outpatient Medications   Medication Sig Dispense Refill     Ascorbic Acid (VITAMIN C PO) Take 500 mg by mouth daily        aspirin (ASA) 81 MG EC tablet Take 2 tablets (162 mg) by mouth At Bedtime 1 tablet 0     atorvastatin (LIPITOR) 40 MG tablet Take 40 mg by mouth every evening        Biotin 1 MG CAPS Take by mouth daily       cyanocobalamin 1000 MCG SUBL Place 1,000 mcg under the tongue daily       cycloSPORINE (CYCLOSPORINE IN KLARITY) 0.1 % EMUL        MAGNESIUM PO Take 1 tablet by mouth daily       melatonin 5 MG tablet Take 5 mg by mouth every evening       metoprolol (LOPRESSOR) 50 MG tablet Take 25 mg by mouth 2 times daily  60 tablet      Naproxen Sodium (ALEVE PO) Take 550 mg by mouth 2 times daily (with meals)        nitroglycerin (NITROSTAT) 0.4 MG SL tablet Place 1 tablet (0.4 mg) under the tongue every 5 minutes as needed for chest pain 25 tablet 0     UNABLE TO FIND Vit D3 25 mg once a day       VITAMIN E PO Take 400 Units by mouth daily        CALCIUM-VITAMIN D PO Take 1 tablet by mouth daily       Coenzyme Q10 (COQ-10 PO) Take 1 tablet by mouth daily        cycloSPORINE (RESTASIS) 0.05 % ophthalmic emulsion Place 1 drop into both eyes 2 times daily       Krill Oil 350 MG CAPS          ALLERGIES   No Known Allergies    PAST MEDICAL HISTORY:  Past Medical History:   Diagnosis Date     Coronary artery disease     stent 2013     Coronary artery disease involving native coronary artery of native heart without angina pectoris 12/19/2016     Essential hypertension 12/19/2016     Myocardial infarction (H)     NSTEMI 2013     NSTEMI (non-ST elevated myocardial infarction) (H) 10/18/2013     Osteoarthritis of knee      Pure hypercholesterolemia      S/P right coronary artery (RCA) stent placement 12/19/2017       PAST SURGICAL HISTORY:  Past Surgical History:   Procedure Laterality Date     HC LEFT HEART CATHETERIZATION  10/18/13    Stent to RCA     lens implant surgery         FAMILY HISTORY:  Family History   Problem Relation Age of Onset     Cerebrovascular Disease Mother 79        possible MI     Hypertension Mother      C.A.D. Father 59        MI     C.A.D. Brother         2 stents     C.A.D. Other         MI     Cancer Other 69        lung       SOCIAL HISTORY:  Social History     Socioeconomic History     Marital status:      Spouse name: None     Number of children: None     Years of education: None     Highest education level: None   Occupational History     None   Social Needs     Financial resource strain: None     Food insecurity:     Worry: None     Inability: None     Transportation needs:     Medical: None     Non-medical: None   Tobacco Use     Smoking status: Former Smoker     Smokeless tobacco: Never Used     Tobacco comment: 1967   Substance and Sexual Activity     Alcohol use: Yes     Alcohol/week: 0.0 standard drinks     Comment: 1-2 a day.     Drug use: No     Sexual activity: None   Lifestyle     Physical activity:     Days per week: None     Minutes per session: None     Stress: None   Relationships     Social connections:     Talks on phone:  "None     Gets together: None     Attends Voodoo service: None     Active member of club or organization: None     Attends meetings of clubs or organizations: None     Relationship status: None     Intimate partner violence:     Fear of current or ex partner: None     Emotionally abused: None     Physically abused: None     Forced sexual activity: None   Other Topics Concern     Parent/sibling w/ CABG, MI or angioplasty before 65F 55M? Not Asked      Service Not Asked     Blood Transfusions Not Asked     Caffeine Concern No     Comment: 1-2  mug mornings     Occupational Exposure Not Asked     Hobby Hazards Not Asked     Sleep Concern No     Comment: does not sleep well     Stress Concern Not Asked     Weight Concern Not Asked     Special Diet No     Back Care Not Asked     Exercise No     Comment: due to knees     Bike Helmet Not Asked     Seat Belt Yes     Self-Exams Not Asked   Social History Narrative     None       Review of Systems:  Skin:  Negative       Eyes:  Positive for glasses;cataracts;glaucoma cataract extraction of right eye; 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; cataract in left eye  ENT:  Positive for tinnitus    Respiratory:  Positive for cough tickle cough   Cardiovascular:    Positive for;palpitations;fatigue sensation in chest that is \"not quite right\"  Gastroenterology: Positive for poor appetite    Genitourinary:  Positive for      Musculoskeletal:  Positive for joint pain;nocturnal cramping shoulders, both knees; Right TKR  Neurologic:  Positive for headaches;numbness or tingling of hands    Psychiatric:  Positive for sleep disturbances due to cramping cramping, numbness/tingling in hands  Heme/Lymph/Imm:  Negative      Endocrine:  Negative        Physical Exam:  Vitals: /70 (BP Location: Right arm, Patient Position: Sitting, Cuff Size: Adult Large)   Pulse 64   Ht 1.537 m (5' 0.5\")   BMI 25.93 kg/m       Constitutional:  cooperative, alert and oriented, well developed, well " nourished, in no acute distress        Skin:  warm and dry to the touch, no apparent skin lesions or masses noted          Head:  normocephalic, no masses or lesions        Eyes:  pupils equal and round, conjunctivae and lids unremarkable, sclera white, no xanthalasma, EOMS intact, no nystagmus        Lymph:      ENT:  no pallor or cyanosis, dentition good        Neck:  carotid pulses are full and equal bilaterally, JVP normal, no carotid bruit        Respiratory:  normal breath sounds, clear to auscultation, normal A-P diameter, normal symmetry, normal respiratory excursion, no use of accessory muscles    fewe rales in left base otherwise clear to auscultation    Cardiac: regular rhythm;normal S1 and S2;apical impulse not displaced   S4   systolic ejection murmur;grade 1;LLSB;radiation to the RUSB        pulses full and equal, no bruits auscultated                                        GI:  abdomen soft, non-tender, BS normoactive, no mass, no HSM, no bruits        Extremities and Muscular Skeletal:  no edema arthritic deformities of LE            Neurological:  affect appropriate   walks with a cane    Psych:  Alert and Oriented x 3        CC  Michael Wright MD  [address] [facility] [place] [address]                Thank you for allowing me to participate in the care of your patient.      Sincerely,     Michael Wright MD     [facility]    cc:   Michael Wright MD  [id number] [facility] [place] [place] [address]

## 2019-12-20 NOTE — PROGRESS NOTES
HPI and Plan:   See dictation:[id number]    Orders Placed This Encounter   Procedures     Follow-Up with Cardiologist       Orders Placed This Encounter   Medications     cycloSPORINE (CYCLOSPORINE IN KLARITY) 0.1 % EMUL       There are no discontinued medications.      Encounter Diagnoses   Name Primary?     Coronary artery disease involving native coronary artery of native heart without angina pectoris Yes     NSTEMI (non-ST elevated myocardial infarction) (H)      S/P right coronary artery (RCA) stent placement      Essential hypertension      Pure hypercholesterolemia        CURRENT MEDICATIONS:  Current Outpatient Medications   Medication Sig Dispense Refill     Ascorbic Acid (VITAMIN C PO) Take 500 mg by mouth daily        aspirin (ASA) 81 MG EC tablet Take 2 tablets (162 mg) by mouth At Bedtime 1 tablet 0     atorvastatin (LIPITOR) 40 MG tablet Take 40 mg by mouth every evening        Biotin 1 MG CAPS Take by mouth daily       cyanocobalamin 1000 MCG SUBL Place 1,000 mcg under the tongue daily       cycloSPORINE (CYCLOSPORINE IN KLARITY) 0.1 % EMUL        MAGNESIUM PO Take 1 tablet by mouth daily       melatonin 5 MG tablet Take 5 mg by mouth every evening       metoprolol (LOPRESSOR) 50 MG tablet Take 25 mg by mouth 2 times daily  60 tablet      Naproxen Sodium (ALEVE PO) Take 550 mg by mouth 2 times daily (with meals)        nitroglycerin (NITROSTAT) 0.4 MG SL tablet Place 1 tablet (0.4 mg) under the tongue every 5 minutes as needed for chest pain 25 tablet 0     UNABLE TO FIND Vit D3 25 mg once a day       VITAMIN E PO Take 400 Units by mouth daily        CALCIUM-VITAMIN D PO Take 1 tablet by mouth daily       Coenzyme Q10 (COQ-10 PO) Take 1 tablet by mouth daily       cycloSPORINE (RESTASIS) 0.05 % ophthalmic emulsion Place 1 drop into both eyes 2 times daily       Krill Oil 350 MG CAPS          ALLERGIES   No Known Allergies    PAST MEDICAL HISTORY:  Past Medical History:   Diagnosis Date     Coronary artery  disease     stent 2013     Coronary artery disease involving native coronary artery of native heart without angina pectoris 12/19/2016     Essential hypertension 12/19/2016     Myocardial infarction (H)     NSTEMI 2013     NSTEMI (non-ST elevated myocardial infarction) (H) 10/18/2013     Osteoarthritis of knee      Pure hypercholesterolemia      S/P right coronary artery (RCA) stent placement 12/19/2017       PAST SURGICAL HISTORY:  Past Surgical History:   Procedure Laterality Date     HC LEFT HEART CATHETERIZATION  10/18/13    Stent to RCA     lens implant surgery         FAMILY HISTORY:  Family History   Problem Relation Age of Onset     Cerebrovascular Disease Mother 79        possible MI     Hypertension Mother      C.A.D. Father 59        MI     C.A.D. Brother         2 stents     C.A.D. Other         MI     Cancer Other 69        lung       SOCIAL HISTORY:  Social History     Socioeconomic History     Marital status:      Spouse name: None     Number of children: None     Years of education: None     Highest education level: None   Occupational History     None   Social Needs     Financial resource strain: None     Food insecurity:     Worry: None     Inability: None     Transportation needs:     Medical: None     Non-medical: None   Tobacco Use     Smoking status: Former Smoker     Smokeless tobacco: Never Used     Tobacco comment: 1967   Substance and Sexual Activity     Alcohol use: Yes     Alcohol/week: 0.0 standard drinks     Comment: 1-2 a day.     Drug use: No     Sexual activity: None   Lifestyle     Physical activity:     Days per week: None     Minutes per session: None     Stress: None   Relationships     Social connections:     Talks on phone: None     Gets together: None     Attends Oriental orthodox service: None     Active member of club or organization: None     Attends meetings of clubs or organizations: None     Relationship status: None     Intimate partner violence:     Fear of current or  "ex partner: None     Emotionally abused: None     Physically abused: None     Forced sexual activity: None   Other Topics Concern     Parent/sibling w/ CABG, MI or angioplasty before 65F 55M? Not Asked      Service Not Asked     Blood Transfusions Not Asked     Caffeine Concern No     Comment: 1-2  mug mornings     Occupational Exposure Not Asked     Hobby Hazards Not Asked     Sleep Concern No     Comment: does not sleep well     Stress Concern Not Asked     Weight Concern Not Asked     Special Diet No     Back Care Not Asked     Exercise No     Comment: due to knees     Bike Helmet Not Asked     Seat Belt Yes     Self-Exams Not Asked   Social History Narrative     None       Review of Systems:  Skin:  Negative       Eyes:  Positive for glasses;cataracts;glaucoma cataract extraction of right eye; 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; cataract in left eye  ENT:  Positive for tinnitus    Respiratory:  Positive for cough tickle cough   Cardiovascular:    Positive for;palpitations;fatigue sensation in chest that is \"not quite right\"  Gastroenterology: Positive for poor appetite    Genitourinary:  Positive for      Musculoskeletal:  Positive for joint pain;nocturnal cramping shoulders, both knees; Right TKR  Neurologic:  Positive for headaches;numbness or tingling of hands    Psychiatric:  Positive for sleep disturbances due to cramping cramping, numbness/tingling in hands  Heme/Lymph/Imm:  Negative      Endocrine:  Negative        Physical Exam:  Vitals: /70 (BP Location: Right arm, Patient Position: Sitting, Cuff Size: Adult Large)   Pulse 64   Ht 1.537 m (5' 0.5\")   BMI 25.93 kg/m      Constitutional:  cooperative, alert and oriented, well developed, well nourished, in no acute distress        Skin:  warm and dry to the touch, no apparent skin lesions or masses noted          Head:  normocephalic, no masses or lesions        Eyes:  pupils equal and round, conjunctivae and lids unremarkable, sclera " white, no xanthalasma, EOMS intact, no nystagmus        Lymph:      ENT:  no pallor or cyanosis, dentition good        Neck:  carotid pulses are full and equal bilaterally, JVP normal, no carotid bruit        Respiratory:  normal breath sounds, clear to auscultation, normal A-P diameter, normal symmetry, normal respiratory excursion, no use of accessory muscles    fewe rales in left base otherwise clear to auscultation    Cardiac: regular rhythm;normal S1 and S2;apical impulse not displaced   S4   systolic ejection murmur;grade 1;LLSB;radiation to the RUSB        pulses full and equal, no bruits auscultated                                        GI:  abdomen soft, non-tender, BS normoactive, no mass, no HSM, no bruits        Extremities and Muscular Skeletal:  no edema arthritic deformities of LE            Neurological:  affect appropriate   walks with a cane    Psych:  Alert and Oriented x 3        CC  Michael Wright MD  [address]  [place] [address]

## 2019-12-21 NOTE — PROGRESS NOTES
Service Date: 12/20/2019      PRIMARY CARE PHYSICIAN:  Dr. Minesh Conte.      HISTORY OF PRESENT ILLNESS:  I again had the pleasure of seeing your patient, Malinda Beckham, at [facility] in [place] for evaluation of coronary artery disease and intermittent chest discomfort.  The patient was hospitalized at [facility] 10/18/2013 for chest discomfort and throat burning.  Coronary angiography demonstrated a 95% proximal right coronary artery stenosis that was stented using a drug-eluting stent.  A 60%-70% proximal LAD stenosis and a 50% mid-circumflex artery stenosis were left untreated.  Nuclear stress test in 2014 and 2015 demonstrated no evidence of ischemia or infarction with well-maintained ejection fraction.  The patient denies any recent chest symptoms.  A 24-hour ambulatory blood pressure monitor and Holter monitor performed in 2015 showed PVCs and PACs but no tachy or bradyarrhythmias.  A 24-hour ambulatory blood pressure monitor showed approximately 20% of the systolic readings were elevated and 80% normal.  The patient underwent a right total knee arthroplasty in 02/2018.  She is limited because of her left lower extremity arthritis.  She has increased her aspirin at 162 mg at bedtime for that arthritic pain.      LABORATORY TESTS:  On 08/29/2019 include TSH of 1.93.  Total cholesterol 161, triglycerides 119, HDL 85, LDL 52, VLDL 23.8 with a ratio of 1.9.  Sodium 140, potassium 4.4, chloride 104, bicarbonate 24, BUN 23, creatinine 0.88 and glucose 92.      PHYSICAL EXAMINATION:  As listed below.      ASSESSMENT:   1.  Malinda Beckham is a pleasant 74-year-old female status post non-ST elevation myocardial infarction in 10/2013.  Her nuclear stress test in 2014 and 2015 were normal despite ongoing chest pain.  We will continue to monitor.  I have not performed another stress test without recurrent significant anginal symptoms.  Some of her previous chest pain was undoubtedly  noncardiac.   2.  The patient's systolic blood pressure remains borderline to mildly elevated.  I have again discussed a low-salt diet with her.  She will return to her primary care for treatment should this persist.   3.  Fasting lipid profile currently under excellent control and followed by her primary care physician.   4.  I have suggested to this patient in the past that most of her over-the-counter medications are unnecessary.  I did not discuss this today.   5.  Exam demonstrates ongoing systolic ejection murmur with an echocardiogram in 2013 demonstrating normal aortic valve and 1+ tricuspid regurgitation.  Ejection fraction at that time was 55%-60% with moderate to severe inferolateral wall hypokinesis.      It is my pleasure to assist in the care of Malinda Rodríguez.  All her questions were answered to her satisfaction.  I will see her again in 1 year or earlier on a p.r.n. basis.      Kelly Gomes MD       cc:   [place] [address]         KELLY GOMES MD, [facility]             D: 2019   T: 2019   MT: TARAH      Name:     MALINDA RODRÍGUEZ   MRN:      [id number]        Account:      [id number]   :      1945           Service Date: 2019      Document: [id number]

## 2021-01-14 ENCOUNTER — OFFICE VISIT (OUTPATIENT)
Dept: CARDIOLOGY | Facility: CLINIC | Age: 76
End: 2021-01-14
Payer: COMMERCIAL

## 2021-01-14 VITALS
HEIGHT: 61 IN | BODY MASS INDEX: 24.55 KG/M2 | DIASTOLIC BLOOD PRESSURE: 81 MMHG | WEIGHT: 130 LBS | SYSTOLIC BLOOD PRESSURE: 135 MMHG | HEART RATE: 59 BPM

## 2021-01-14 DIAGNOSIS — I20.89 STABLE ANGINA PECTORIS (H): ICD-10-CM

## 2021-01-14 DIAGNOSIS — I25.10 CORONARY ARTERY DISEASE INVOLVING NATIVE CORONARY ARTERY OF NATIVE HEART WITHOUT ANGINA PECTORIS: Primary | ICD-10-CM

## 2021-01-14 PROCEDURE — 99214 OFFICE O/P EST MOD 30 MIN: CPT | Performed by: INTERNAL MEDICINE

## 2021-01-14 PROCEDURE — 93000 ELECTROCARDIOGRAM COMPLETE: CPT | Performed by: INTERNAL MEDICINE

## 2021-01-14 ASSESSMENT — MIFFLIN-ST. JEOR: SCORE: 1004.12

## 2021-01-14 NOTE — LETTER
1/14/2021      Minesh Cotne MD  [facility] [address]  [facility] [address]      RE: Malinda Beckham       Dear Colleague,    I had the pleasure of seeing Malinda Beckham in the [facility].    Service Date: 01/14/2021      CLINIC NOTE      HISTORY OF PRESENT ILLNESS:  It is a pleasure for me to see this very pleasant, 75-year-old lady for evaluation of chest discomfort.      This lady is known to have a history of coronary artery disease.  She had been following with my late partner, Dr. Michael Wright.  She first presented in 10/2013 with typical chest discomfort.  She was found to have a non-Q-wave myocardial infarction, and coronary angiography demonstrated a 95% proximal right coronary artery stenosis, which was revascularized with a drug-eluting stent.  Other coronary artery lesions included a 60%-70% proximal LAD stenosis and a 50% mid circumflex artery stenosis, which were not felt to be hemodynamically significant.  She has had stress nuclear studies in [id number], which demonstrated no areas of significant infarction or ischemia.  She has also had rhythm monitoring in 2015, which showed occasional PVCs and PACs, but no significant arrhythmias.  She also had a 24 hour ambulatory blood pressure, which I believe shows adequate blood pressure control.  She has had occasional chest discomfort, which was not felt by Real to be cardiac in origin.      This lady was doing well until a few days ago when she had a spontaneous onset of localized substernal chest discomfort.  This happened while she was watching television.  The discomfort was not severe and lasted for about 1/2 hour.  It was relieved by 1 sublingual nitroglycerin.  Since then, she has been well without recurrence of any chest discomfort.  Her cardiac examination is unremarkable.  Her EKG today showed sinus bradycardia and is otherwise normal as well.      IMPRESSION:   1.  Atypical chest  discomfort.  There is certainly a concern for this episode being new onset angina, and I will arrange for her to have a Lexiscan nuclear study.  Exercise tolerance is limited by lower limb arthritis.   2.  Adequate blood pressure control.   3.  Dyslipidemia, on 40 mg of atorvastatin.  Levels have been carefully followed by her primary physician.      If her stress nuclear study is normal and she does not have any further episodes of chest discomfort, I plan to follow her again in a year's time.      It has been my pleasure to be involved in this delightful lady's care.         BANDAR VALDERRAMA MD, [facility]             D: 2021   T: 2021   MT: vannessa      Name:     DYLAN RODRÍGUEZ   MRN:      [id number]        Account:      [id number]   :      1945           Service Date: 2021      Document: [id number]        Outpatient Encounter Medications as of 2021   Medication Sig Dispense Refill     Ascorbic Acid (VITAMIN C PO) Take 500 mg by mouth daily        aspirin (ASA) 81 MG EC tablet Take 2 tablets (162 mg) by mouth At Bedtime 1 tablet 0     atorvastatin (LIPITOR) 40 MG tablet Take 40 mg by mouth every evening        Biotin 1 MG CAPS Take by mouth daily       CALCIUM-VITAMIN D PO Take 1 tablet by mouth daily       cyanocobalamin 1000 MCG SUBL Place 1,000 mcg under the tongue daily       cycloSPORINE (RESTASIS) 0.05 % ophthalmic emulsion Place 1 drop into both eyes 2 times daily       MAGNESIUM PO Take 1 tablet by mouth daily       melatonin 5 MG tablet Take 5 mg by mouth every evening       metoprolol (LOPRESSOR) 50 MG tablet Take 25 mg by mouth 2 times daily  60 tablet      Naproxen Sodium (ALEVE PO) Take 550 mg by mouth 2 times daily (with meals)        nitroglycerin (NITROSTAT) 0.4 MG SL tablet Place 1 tablet (0.4 mg) under the tongue every 5 minutes as needed for chest pain 25 tablet 0     UNABLE TO FIND Vit D3 25 mg once a day       VITAMIN E PO Take 400 Units by mouth daily        [DISCONTINUED]  Coenzyme Q10 (COQ-10 PO) Take 1 tablet by mouth daily       [DISCONTINUED] cycloSPORINE (CYCLOSPORINE IN KLARITY) 0.1 % EMUL        [DISCONTINUED] Krill Oil 350 MG CAPS        No facility-administered encounter medications on file as of 1/14/2021.        Again, thank you for allowing me to participate in the care of your patient.      Sincerely,    DR BANDAR VALDERRAMA MD     [facility]

## 2021-01-14 NOTE — PROGRESS NOTES
HPI and Plan:   See dictation    Orders Placed This Encounter   Procedures     Follow-Up with Cardiologist     EKG 12-lead complete w/read - Clinics (performed today)       No orders of the defined types were placed in this encounter.      Encounter Diagnoses   Name Primary?     Coronary artery disease involving native coronary artery of native heart without angina pectoris Yes     Stable angina pectoris (H)        CURRENT MEDICATIONS:  Current Outpatient Medications   Medication Sig Dispense Refill     Ascorbic Acid (VITAMIN C PO) Take 500 mg by mouth daily        aspirin (ASA) 81 MG EC tablet Take 2 tablets (162 mg) by mouth At Bedtime 1 tablet 0     atorvastatin (LIPITOR) 40 MG tablet Take 40 mg by mouth every evening        Biotin 1 MG CAPS Take by mouth daily       CALCIUM-VITAMIN D PO Take 1 tablet by mouth daily       cyanocobalamin 1000 MCG SUBL Place 1,000 mcg under the tongue daily       cycloSPORINE (RESTASIS) 0.05 % ophthalmic emulsion Place 1 drop into both eyes 2 times daily       MAGNESIUM PO Take 1 tablet by mouth daily       melatonin 5 MG tablet Take 5 mg by mouth every evening       metoprolol (LOPRESSOR) 50 MG tablet Take 25 mg by mouth 2 times daily  60 tablet      Naproxen Sodium (ALEVE PO) Take 550 mg by mouth 2 times daily (with meals)        nitroglycerin (NITROSTAT) 0.4 MG SL tablet Place 1 tablet (0.4 mg) under the tongue every 5 minutes as needed for chest pain 25 tablet 0     UNABLE TO FIND Vit D3 25 mg once a day       VITAMIN E PO Take 400 Units by mouth daily          ALLERGIES     Allergies   Allergen Reactions     Lisinopril Cough       PAST MEDICAL HISTORY:  Past Medical History:   Diagnosis Date     Coronary artery disease     stent 2013     Coronary artery disease involving native coronary artery of native heart without angina pectoris 12/19/2016     Essential hypertension 12/19/2016     Myocardial infarction (H)     NSTEMI 2013     NSTEMI (non-ST elevated myocardial infarction)  (H) 10/18/2013     Osteoarthritis of knee      Pure hypercholesterolemia      S/P right coronary artery (RCA) stent placement 12/19/2017       PAST SURGICAL HISTORY:  Past Surgical History:   Procedure Laterality Date     HC LEFT HEART CATHETERIZATION  10/18/13    Stent to RCA     lens implant surgery         FAMILY HISTORY:  Family History   Problem Relation Age of Onset     Cerebrovascular Disease Mother 79        possible MI     Hypertension Mother      C.A.D. Father 59        MI     C.A.D. Brother         2 stents     C.A.D. Other         MI     Cancer Other 69        lung       SOCIAL HISTORY:  Social History     Socioeconomic History     Marital status:      Spouse name: None     Number of children: None     Years of education: None     Highest education level: None   Occupational History     None   Social Needs     Financial resource strain: None     Food insecurity     Worry: None     Inability: None     Transportation needs     Medical: None     Non-medical: None   Tobacco Use     Smoking status: Former Smoker     Smokeless tobacco: Never Used     Tobacco comment: 1967   Substance and Sexual Activity     Alcohol use: Yes     Alcohol/week: 0.0 standard drinks     Comment: 1-2 a day.     Drug use: No     Sexual activity: None   Lifestyle     Physical activity     Days per week: None     Minutes per session: None     Stress: None   Relationships     Social connections     Talks on phone: None     Gets together: None     Attends Muslim service: None     Active member of club or organization: None     Attends meetings of clubs or organizations: None     Relationship status: None     Intimate partner violence     Fear of current or ex partner: None     Emotionally abused: None     Physically abused: None     Forced sexual activity: None   Other Topics Concern     Parent/sibling w/ CABG, MI or angioplasty before 65F 55M? Not Asked      Service Not Asked     Blood Transfusions Not Asked      "Caffeine Concern No     Comment: 1-2  mug mornings     Occupational Exposure Not Asked     Hobby Hazards Not Asked     Sleep Concern No     Comment: does not sleep well     Stress Concern Not Asked     Weight Concern Not Asked     Special Diet No     Back Care Not Asked     Exercise No     Comment: due to knees     Bike Helmet Not Asked     Seat Belt Yes     Self-Exams Not Asked   Social History Narrative     None       Review of Systems:  Skin:  Negative     Eyes:  Positive for glasses  ENT:  Negative    Respiratory:  Negative    Cardiovascular:    palpitations;Positive for;chest pain  Gastroenterology: Negative    Genitourinary:  Negative    Musculoskeletal:  Positive for joint pain  Neurologic:  Negative    Psychiatric:  Negative    Heme/Lymph/Imm:  Negative    Endocrine:  Negative      Physical Exam:  Vitals: /81 (BP Location: Right arm, Patient Position: Sitting, Cuff Size: Adult Regular)   Pulse 59   Ht 1.537 m (5' 0.5\")   Wt 59 kg (130 lb)   LMP  (LMP Unknown)   Breastfeeding No   BMI 24.97 kg/m      Constitutional:  cooperative, alert and oriented, well developed, well nourished, in no acute distress        Skin:  warm and dry to the touch, no apparent skin lesions or masses noted          Head:  normocephalic, no masses or lesions        Eyes:  pupils equal and round, conjunctivae and lids unremarkable, sclera white, no xanthalasma, EOMS intact, no nystagmus        Lymph:No Cervical lymphadenopathy present     ENT:  no pallor or cyanosis, dentition good        Neck:  carotid pulses are full and equal bilaterally, JVP normal, no carotid bruit        Respiratory:  normal breath sounds, clear to auscultation, normal A-P diameter, normal symmetry, normal respiratory excursion, no use of accessory muscles    fewe rales in left base otherwise clear to auscultation    Cardiac: regular rhythm;normal S1 and S2;apical impulse not displaced;regular rhythm, normal S1/S2, no S3 or S4, apical impulse not " displaced, no murmurs, gallops or rubs                pulses full and equal, no bruits auscultated                                        GI:  abdomen soft, non-tender, BS normoactive, no mass, no HSM, no bruits        Extremities and Muscular Skeletal:  no edema arthritic deformities of LE            Neurological:  affect appropriate   walks with a cane    Psych:  Alert and Oriented x 3        Recent Lab Results:  LIPID RESULTS:  Lab Results   Component Value Date    CHOL 151 04/17/2018    HDL 71 04/17/2018    LDL 48.6 04/17/2018    TRIG 157 04/17/2018    CHOLHDLRATIO 1.9 02/10/2015       LIVER ENZYME RESULTS:  Lab Results   Component Value Date    AST 27 04/17/2018    ALT 28 04/17/2018       CBC RESULTS:  Lab Results   Component Value Date    WBC 10.1 (A) 04/17/2018    RBC 4.37 04/17/2018    HGB 13.2 04/17/2018    HCT 40.2 04/17/2018    MCV 92.0 04/17/2018    MCH 30.2 04/17/2018    MCHC 32.8 04/17/2018    RDW 13.3 04/17/2018     04/17/2018       BMP RESULTS:  Lab Results   Component Value Date     04/17/2018    POTASSIUM 4.4 04/17/2018    CHLORIDE 104 04/17/2018    CO2 22 10/23/2015    ANIONGAP 9 10/23/2015    GLC 92 04/17/2018    BUN 17 04/17/2018    CR 0.69 04/17/2018    GFRESTIMATED 83 10/23/2015    GFRESTBLACK >90   GFR Calc   10/23/2015    DAMASO 9.1 04/17/2018        A1C RESULTS:  Lab Results   Component Value Date    A1C 5.1 04/17/2018       INR RESULTS:  Lab Results   Component Value Date    INR 1.05 10/18/2013    INR 0.92 10/17/2013           CC  No referring provider defined for this encounter.              HPI and Plan:   See dictation    Orders Placed This Encounter   Procedures     Follow-Up with Cardiologist     EKG 12-lead complete w/read - Clinics (performed today)       No orders of the defined types were placed in this encounter.      Encounter Diagnoses   Name Primary?     Coronary artery disease involving native coronary artery of native heart without angina pectoris  Yes     Stable angina pectoris (H)        CURRENT MEDICATIONS:  Current Outpatient Medications   Medication Sig Dispense Refill     Ascorbic Acid (VITAMIN C PO) Take 500 mg by mouth daily        aspirin (ASA) 81 MG EC tablet Take 2 tablets (162 mg) by mouth At Bedtime 1 tablet 0     atorvastatin (LIPITOR) 40 MG tablet Take 40 mg by mouth every evening        Biotin 1 MG CAPS Take by mouth daily       CALCIUM-VITAMIN D PO Take 1 tablet by mouth daily       cyanocobalamin 1000 MCG SUBL Place 1,000 mcg under the tongue daily       cycloSPORINE (RESTASIS) 0.05 % ophthalmic emulsion Place 1 drop into both eyes 2 times daily       MAGNESIUM PO Take 1 tablet by mouth daily       melatonin 5 MG tablet Take 5 mg by mouth every evening       metoprolol (LOPRESSOR) 50 MG tablet Take 25 mg by mouth 2 times daily  60 tablet      Naproxen Sodium (ALEVE PO) Take 550 mg by mouth 2 times daily (with meals)        nitroglycerin (NITROSTAT) 0.4 MG SL tablet Place 1 tablet (0.4 mg) under the tongue every 5 minutes as needed for chest pain 25 tablet 0     UNABLE TO FIND Vit D3 25 mg once a day       VITAMIN E PO Take 400 Units by mouth daily          ALLERGIES     Allergies   Allergen Reactions     Lisinopril Cough       PAST MEDICAL HISTORY:  Past Medical History:   Diagnosis Date     Coronary artery disease     stent 2013     Coronary artery disease involving native coronary artery of native heart without angina pectoris 12/19/2016     Essential hypertension 12/19/2016     Myocardial infarction (H)     NSTEMI 2013     NSTEMI (non-ST elevated myocardial infarction) (H) 10/18/2013     Osteoarthritis of knee      Pure hypercholesterolemia      S/P right coronary artery (RCA) stent placement 12/19/2017       PAST SURGICAL HISTORY:  Past Surgical History:   Procedure Laterality Date     HC LEFT HEART CATHETERIZATION  10/18/13    Stent to RCA     lens implant surgery         FAMILY HISTORY:  Family History   Problem Relation Age of Onset      Cerebrovascular Disease Mother 79        possible MI     Hypertension Mother      C.A.D. Father 59        MI     C.A.D. Brother         2 stents     C.A.D. Other         MI     Cancer Other 69        lung       SOCIAL HISTORY:  Social History     Socioeconomic History     Marital status:      Spouse name: None     Number of children: None     Years of education: None     Highest education level: None   Occupational History     None   Social Needs     Financial resource strain: None     Food insecurity     Worry: None     Inability: None     Transportation needs     Medical: None     Non-medical: None   Tobacco Use     Smoking status: Former Smoker     Smokeless tobacco: Never Used     Tobacco comment: 1967   Substance and Sexual Activity     Alcohol use: Yes     Alcohol/week: 0.0 standard drinks     Comment: 1-2 a day.     Drug use: No     Sexual activity: None   Lifestyle     Physical activity     Days per week: None     Minutes per session: None     Stress: None   Relationships     Social connections     Talks on phone: None     Gets together: None     Attends Restorationism service: None     Active member of club or organization: None     Attends meetings of clubs or organizations: None     Relationship status: None     Intimate partner violence     Fear of current or ex partner: None     Emotionally abused: None     Physically abused: None     Forced sexual activity: None   Other Topics Concern     Parent/sibling w/ CABG, MI or angioplasty before 65F 55M? Not Asked      Service Not Asked     Blood Transfusions Not Asked     Caffeine Concern No     Comment: 1-2  mug mornings     Occupational Exposure Not Asked     Hobby Hazards Not Asked     Sleep Concern No     Comment: does not sleep well     Stress Concern Not Asked     Weight Concern Not Asked     Special Diet No     Back Care Not Asked     Exercise No     Comment: due to knees     Bike Helmet Not Asked     Seat Belt Yes     Self-Exams Not Asked  "  Social History Narrative     None       Review of Systems:  Skin:  Negative     Eyes:  Positive for glasses  ENT:  Negative    Respiratory:  Negative    Cardiovascular:    palpitations;Positive for;chest pain  Gastroenterology: Negative    Genitourinary:  Negative    Musculoskeletal:  Positive for joint pain  Neurologic:  Negative    Psychiatric:  Negative    Heme/Lymph/Imm:  Negative    Endocrine:  Negative      Physical Exam:  Vitals: /81 (BP Location: Right arm, Patient Position: Sitting, Cuff Size: Adult Regular)   Pulse 59   Ht 1.537 m (5' 0.5\")   Wt 59 kg (130 lb)   LMP  (LMP Unknown)   Breastfeeding No   BMI 24.97 kg/m      Constitutional:  cooperative, alert and oriented, well developed, well nourished, in no acute distress        Skin:  warm and dry to the touch, no apparent skin lesions or masses noted          Head:  normocephalic, no masses or lesions        Eyes:  pupils equal and round, conjunctivae and lids unremarkable, sclera white, no xanthalasma, EOMS intact, no nystagmus        Lymph:No Cervical lymphadenopathy present     ENT:  no pallor or cyanosis, dentition good        Neck:  carotid pulses are full and equal bilaterally, JVP normal, no carotid bruit        Respiratory:  normal breath sounds, clear to auscultation, normal A-P diameter, normal symmetry, normal respiratory excursion, no use of accessory muscles    fewe rales in left base otherwise clear to auscultation    Cardiac: regular rhythm;normal S1 and S2;apical impulse not displaced;regular rhythm, normal S1/S2, no S3 or S4, apical impulse not displaced, no murmurs, gallops or rubs                pulses full and equal, no bruits auscultated                                        GI:  abdomen soft, non-tender, BS normoactive, no mass, no HSM, no bruits        Extremities and Muscular Skeletal:  no edema arthritic deformities of LE            Neurological:  affect appropriate   walks with a cane    Psych:  Alert and Oriented " x 3        Recent Lab Results:  LIPID RESULTS:  Lab Results   Component Value Date    CHOL 151 04/17/2018    HDL 71 04/17/2018    LDL 48.6 04/17/2018    TRIG 157 04/17/2018    CHOLHDLRATIO 1.9 02/10/2015       LIVER ENZYME RESULTS:  Lab Results   Component Value Date    AST 27 04/17/2018    ALT 28 04/17/2018       CBC RESULTS:  Lab Results   Component Value Date    WBC 10.1 (A) 04/17/2018    RBC 4.37 04/17/2018    HGB 13.2 04/17/2018    HCT 40.2 04/17/2018    MCV 92.0 04/17/2018    MCH 30.2 04/17/2018    MCHC 32.8 04/17/2018    RDW 13.3 04/17/2018     04/17/2018       BMP RESULTS:  Lab Results   Component Value Date     04/17/2018    POTASSIUM 4.4 04/17/2018    CHLORIDE 104 04/17/2018    CO2 22 10/23/2015    ANIONGAP 9 10/23/2015    GLC 92 04/17/2018    BUN 17 04/17/2018    CR 0.69 04/17/2018    GFRESTIMATED 83 10/23/2015    GFRESTBLACK >90   GFR Calc   10/23/2015    DAMASO 9.1 04/17/2018        A1C RESULTS:  Lab Results   Component Value Date    A1C 5.1 04/17/2018       INR RESULTS:  Lab Results   Component Value Date    INR 1.05 10/18/2013    INR 0.92 10/17/2013           CC  No referring provider defined for this encounter.

## 2021-01-14 NOTE — LETTER
1/14/2021    Minesh Conte MD  [facility] [address]  [facility] [address]    RE: Malinda Beckham       Dear Colleague,    I had the pleasure of seeing Malinda Beckham in the [facility].    HPI and Plan:   See dictation    Orders Placed This Encounter   Procedures     Follow-Up with Cardiologist     EKG 12-lead complete w/read - Clinics (performed today)       No orders of the defined types were placed in this encounter.      Encounter Diagnoses   Name Primary?     Coronary artery disease involving native coronary artery of native heart without angina pectoris Yes     Stable angina pectoris (H)        CURRENT MEDICATIONS:  Current Outpatient Medications   Medication Sig Dispense Refill     Ascorbic Acid (VITAMIN C PO) Take 500 mg by mouth daily        aspirin (ASA) 81 MG EC tablet Take 2 tablets (162 mg) by mouth At Bedtime 1 tablet 0     atorvastatin (LIPITOR) 40 MG tablet Take 40 mg by mouth every evening        Biotin 1 MG CAPS Take by mouth daily       CALCIUM-VITAMIN D PO Take 1 tablet by mouth daily       cyanocobalamin 1000 MCG SUBL Place 1,000 mcg under the tongue daily       cycloSPORINE (RESTASIS) 0.05 % ophthalmic emulsion Place 1 drop into both eyes 2 times daily       MAGNESIUM PO Take 1 tablet by mouth daily       melatonin 5 MG tablet Take 5 mg by mouth every evening       metoprolol (LOPRESSOR) 50 MG tablet Take 25 mg by mouth 2 times daily  60 tablet      Naproxen Sodium (ALEVE PO) Take 550 mg by mouth 2 times daily (with meals)        nitroglycerin (NITROSTAT) 0.4 MG SL tablet Place 1 tablet (0.4 mg) under the tongue every 5 minutes as needed for chest pain 25 tablet 0     UNABLE TO FIND Vit D3 25 mg once a day       VITAMIN E PO Take 400 Units by mouth daily          ALLERGIES     Allergies   Allergen Reactions     Lisinopril Cough       PAST MEDICAL HISTORY:  Past Medical History:   Diagnosis Date     Coronary artery disease     stent  2013     Coronary artery disease involving native coronary artery of native heart without angina pectoris 12/19/2016     Essential hypertension 12/19/2016     Myocardial infarction (H)     NSTEMI 2013     NSTEMI (non-ST elevated myocardial infarction) (H) 10/18/2013     Osteoarthritis of knee      Pure hypercholesterolemia      S/P right coronary artery (RCA) stent placement 12/19/2017       PAST SURGICAL HISTORY:  Past Surgical History:   Procedure Laterality Date     HC LEFT HEART CATHETERIZATION  10/18/13    Stent to RCA     lens implant surgery         FAMILY HISTORY:  Family History   Problem Relation Age of Onset     Cerebrovascular Disease Mother 79        possible MI     Hypertension Mother      C.A.D. Father 59        MI     C.A.D. Brother         2 stents     C.A.D. Other         MI     Cancer Other 69        lung       SOCIAL HISTORY:  Social History     Socioeconomic History     Marital status:      Spouse name: None     Number of children: None     Years of education: None     Highest education level: None   Occupational History     None   Social Needs     Financial resource strain: None     Food insecurity     Worry: None     Inability: None     Transportation needs     Medical: None     Non-medical: None   Tobacco Use     Smoking status: Former Smoker     Smokeless tobacco: Never Used     Tobacco comment: 1967   Substance and Sexual Activity     Alcohol use: Yes     Alcohol/week: 0.0 standard drinks     Comment: 1-2 a day.     Drug use: No     Sexual activity: None   Lifestyle     Physical activity     Days per week: None     Minutes per session: None     Stress: None   Relationships     Social connections     Talks on phone: None     Gets together: None     Attends Tenriism service: None     Active member of club or organization: None     Attends meetings of clubs or organizations: None     Relationship status: None     Intimate partner violence     Fear of current or ex partner: None      "Emotionally abused: None     Physically abused: None     Forced sexual activity: None   Other Topics Concern     Parent/sibling w/ CABG, MI or angioplasty before 65F 55M? Not Asked      Service Not Asked     Blood Transfusions Not Asked     Caffeine Concern No     Comment: 1-2  mug mornings     Occupational Exposure Not Asked     Hobby Hazards Not Asked     Sleep Concern No     Comment: does not sleep well     Stress Concern Not Asked     Weight Concern Not Asked     Special Diet No     Back Care Not Asked     Exercise No     Comment: due to knees     Bike Helmet Not Asked     Seat Belt Yes     Self-Exams Not Asked   Social History Narrative     None       Review of Systems:  Skin:  Negative     Eyes:  Positive for glasses  ENT:  Negative    Respiratory:  Negative    Cardiovascular:    palpitations;Positive for;chest pain  Gastroenterology: Negative    Genitourinary:  Negative    Musculoskeletal:  Positive for joint pain  Neurologic:  Negative    Psychiatric:  Negative    Heme/Lymph/Imm:  Negative    Endocrine:  Negative      Physical Exam:  Vitals: /81 (BP Location: Right arm, Patient Position: Sitting, Cuff Size: Adult Regular)   Pulse 59   Ht 1.537 m (5' 0.5\")   Wt 59 kg (130 lb)   LMP  (LMP Unknown)   Breastfeeding No   BMI 24.97 kg/m      Constitutional:  cooperative, alert and oriented, well developed, well nourished, in no acute distress        Skin:  warm and dry to the touch, no apparent skin lesions or masses noted          Head:  normocephalic, no masses or lesions        Eyes:  pupils equal and round, conjunctivae and lids unremarkable, sclera white, no xanthalasma, EOMS intact, no nystagmus        Lymph:No Cervical lymphadenopathy present     ENT:  no pallor or cyanosis, dentition good        Neck:  carotid pulses are full and equal bilaterally, JVP normal, no carotid bruit        Respiratory:  normal breath sounds, clear to auscultation, normal A-P diameter, normal symmetry, normal " respiratory excursion, no use of accessory muscles    fewe rales in left base otherwise clear to auscultation    Cardiac: regular rhythm;normal S1 and S2;apical impulse not displaced;regular rhythm, normal S1/S2, no S3 or S4, apical impulse not displaced, no murmurs, gallops or rubs                pulses full and equal, no bruits auscultated                                        GI:  abdomen soft, non-tender, BS normoactive, no mass, no HSM, no bruits        Extremities and Muscular Skeletal:  no edema arthritic deformities of LE            Neurological:  affect appropriate   walks with a cane    Psych:  Alert and Oriented x 3        Recent Lab Results:  LIPID RESULTS:  Lab Results   Component Value Date    CHOL 151 04/17/2018    HDL 71 04/17/2018    LDL 48.6 04/17/2018    TRIG 157 04/17/2018    CHOLHDLRATIO 1.9 02/10/2015       LIVER ENZYME RESULTS:  Lab Results   Component Value Date    AST 27 04/17/2018    ALT 28 04/17/2018       CBC RESULTS:  Lab Results   Component Value Date    WBC 10.1 (A) 04/17/2018    RBC 4.37 04/17/2018    HGB 13.2 04/17/2018    HCT 40.2 04/17/2018    MCV 92.0 04/17/2018    MCH 30.2 04/17/2018    MCHC 32.8 04/17/2018    RDW 13.3 04/17/2018     04/17/2018       BMP RESULTS:  Lab Results   Component Value Date     04/17/2018    POTASSIUM 4.4 04/17/2018    CHLORIDE 104 04/17/2018    CO2 22 10/23/2015    ANIONGAP 9 10/23/2015    GLC 92 04/17/2018    BUN 17 04/17/2018    CR 0.69 04/17/2018    GFRESTIMATED 83 10/23/2015    GFRESTBLACK >90   GFR Calc   10/23/2015    DAMASO 9.1 04/17/2018        A1C RESULTS:  Lab Results   Component Value Date    A1C 5.1 04/17/2018       INR RESULTS:  Lab Results   Component Value Date    INR 1.05 10/18/2013    INR 0.92 10/17/2013           CC  No referring provider defined for this encounter.              HPI and Plan:   See dictation    Orders Placed This Encounter   Procedures     Follow-Up with Cardiologist     EKG 12-lead complete  w/read - Clinics (performed today)       No orders of the defined types were placed in this encounter.      Encounter Diagnoses   Name Primary?     Coronary artery disease involving native coronary artery of native heart without angina pectoris Yes     Stable angina pectoris (H)        CURRENT MEDICATIONS:  Current Outpatient Medications   Medication Sig Dispense Refill     Ascorbic Acid (VITAMIN C PO) Take 500 mg by mouth daily        aspirin (ASA) 81 MG EC tablet Take 2 tablets (162 mg) by mouth At Bedtime 1 tablet 0     atorvastatin (LIPITOR) 40 MG tablet Take 40 mg by mouth every evening        Biotin 1 MG CAPS Take by mouth daily       CALCIUM-VITAMIN D PO Take 1 tablet by mouth daily       cyanocobalamin 1000 MCG SUBL Place 1,000 mcg under the tongue daily       cycloSPORINE (RESTASIS) 0.05 % ophthalmic emulsion Place 1 drop into both eyes 2 times daily       MAGNESIUM PO Take 1 tablet by mouth daily       melatonin 5 MG tablet Take 5 mg by mouth every evening       metoprolol (LOPRESSOR) 50 MG tablet Take 25 mg by mouth 2 times daily  60 tablet      Naproxen Sodium (ALEVE PO) Take 550 mg by mouth 2 times daily (with meals)        nitroglycerin (NITROSTAT) 0.4 MG SL tablet Place 1 tablet (0.4 mg) under the tongue every 5 minutes as needed for chest pain 25 tablet 0     UNABLE TO FIND Vit D3 25 mg once a day       VITAMIN E PO Take 400 Units by mouth daily          ALLERGIES     Allergies   Allergen Reactions     Lisinopril Cough       PAST MEDICAL HISTORY:  Past Medical History:   Diagnosis Date     Coronary artery disease     stent 2013     Coronary artery disease involving native coronary artery of native heart without angina pectoris 12/19/2016     Essential hypertension 12/19/2016     Myocardial infarction (H)     NSTEMI 2013     NSTEMI (non-ST elevated myocardial infarction) (H) 10/18/2013     Osteoarthritis of knee      Pure hypercholesterolemia      S/P right coronary artery (RCA) stent placement  12/19/2017       PAST SURGICAL HISTORY:  Past Surgical History:   Procedure Laterality Date     HC LEFT HEART CATHETERIZATION  10/18/13    Stent to RCA     lens implant surgery         FAMILY HISTORY:  Family History   Problem Relation Age of Onset     Cerebrovascular Disease Mother 79        possible MI     Hypertension Mother      C.A.D. Father 59        MI     C.A.D. Brother         2 stents     C.A.D. Other         MI     Cancer Other 69        lung       SOCIAL HISTORY:  Social History     Socioeconomic History     Marital status:      Spouse name: None     Number of children: None     Years of education: None     Highest education level: None   Occupational History     None   Social Needs     Financial resource strain: None     Food insecurity     Worry: None     Inability: None     Transportation needs     Medical: None     Non-medical: None   Tobacco Use     Smoking status: Former Smoker     Smokeless tobacco: Never Used     Tobacco comment: 1967   Substance and Sexual Activity     Alcohol use: Yes     Alcohol/week: 0.0 standard drinks     Comment: 1-2 a day.     Drug use: No     Sexual activity: None   Lifestyle     Physical activity     Days per week: None     Minutes per session: None     Stress: None   Relationships     Social connections     Talks on phone: None     Gets together: None     Attends Latter-day service: None     Active member of club or organization: None     Attends meetings of clubs or organizations: None     Relationship status: None     Intimate partner violence     Fear of current or ex partner: None     Emotionally abused: None     Physically abused: None     Forced sexual activity: None   Other Topics Concern     Parent/sibling w/ CABG, MI or angioplasty before 65F 55M? Not Asked      Service Not Asked     Blood Transfusions Not Asked     Caffeine Concern No     Comment: 1-2  mug mornings     Occupational Exposure Not Asked     Hobby Hazards Not Asked     Sleep Concern  "No     Comment: does not sleep well     Stress Concern Not Asked     Weight Concern Not Asked     Special Diet No     Back Care Not Asked     Exercise No     Comment: due to knees     Bike Helmet Not Asked     Seat Belt Yes     Self-Exams Not Asked   Social History Narrative     None       Review of Systems:  Skin:  Negative     Eyes:  Positive for glasses  ENT:  Negative    Respiratory:  Negative    Cardiovascular:    palpitations;Positive for;chest pain  Gastroenterology: Negative    Genitourinary:  Negative    Musculoskeletal:  Positive for joint pain  Neurologic:  Negative    Psychiatric:  Negative    Heme/Lymph/Imm:  Negative    Endocrine:  Negative      Physical Exam:  Vitals: /81 (BP Location: Right arm, Patient Position: Sitting, Cuff Size: Adult Regular)   Pulse 59   Ht 1.537 m (5' 0.5\")   Wt 59 kg (130 lb)   LMP  (LMP Unknown)   Breastfeeding No   BMI 24.97 kg/m      Constitutional:  cooperative, alert and oriented, well developed, well nourished, in no acute distress        Skin:  warm and dry to the touch, no apparent skin lesions or masses noted          Head:  normocephalic, no masses or lesions        Eyes:  pupils equal and round, conjunctivae and lids unremarkable, sclera white, no xanthalasma, EOMS intact, no nystagmus        Lymph:No Cervical lymphadenopathy present     ENT:  no pallor or cyanosis, dentition good        Neck:  carotid pulses are full and equal bilaterally, JVP normal, no carotid bruit        Respiratory:  normal breath sounds, clear to auscultation, normal A-P diameter, normal symmetry, normal respiratory excursion, no use of accessory muscles    fewe rales in left base otherwise clear to auscultation    Cardiac: regular rhythm;normal S1 and S2;apical impulse not displaced;regular rhythm, normal S1/S2, no S3 or S4, apical impulse not displaced, no murmurs, gallops or rubs                pulses full and equal, no bruits auscultated                                    "     GI:  abdomen soft, non-tender, BS normoactive, no mass, no HSM, no bruits        Extremities and Muscular Skeletal:  no edema arthritic deformities of LE            Neurological:  affect appropriate   walks with a cane    Psych:  Alert and Oriented x 3        Recent Lab Results:  LIPID RESULTS:  Lab Results   Component Value Date    CHOL 151 04/17/2018    HDL 71 04/17/2018    LDL 48.6 04/17/2018    TRIG 157 04/17/2018    CHOLHDLRATIO 1.9 02/10/2015       LIVER ENZYME RESULTS:  Lab Results   Component Value Date    AST 27 04/17/2018    ALT 28 04/17/2018       CBC RESULTS:  Lab Results   Component Value Date    WBC 10.1 (A) 04/17/2018    RBC 4.37 04/17/2018    HGB 13.2 04/17/2018    HCT 40.2 04/17/2018    MCV 92.0 04/17/2018    MCH 30.2 04/17/2018    MCHC 32.8 04/17/2018    RDW 13.3 04/17/2018     04/17/2018       BMP RESULTS:  Lab Results   Component Value Date     04/17/2018    POTASSIUM 4.4 04/17/2018    CHLORIDE 104 04/17/2018    CO2 22 10/23/2015    ANIONGAP 9 10/23/2015    GLC 92 04/17/2018    BUN 17 04/17/2018    CR 0.69 04/17/2018    GFRESTIMATED 83 10/23/2015    GFRESTBLACK >90   GFR Calc   10/23/2015    DAMASO 9.1 04/17/2018        A1C RESULTS:  Lab Results   Component Value Date    A1C 5.1 04/17/2018       INR RESULTS:  Lab Results   Component Value Date    INR 1.05 10/18/2013    INR 0.92 10/17/2013           CC  No referring provider defined for this encounter.                    Thank you for allowing me to participate in the care of your patient.      Sincerely,     DR BANDAR VALDERRAMA MD     [facility]    cc:   No referring provider defined for this encounter.

## 2021-01-14 NOTE — PROGRESS NOTES
Service Date: 01/14/2021      CLINIC NOTE      HISTORY OF PRESENT ILLNESS:  It is a pleasure for me to see this very pleasant, 75-year-old lady for evaluation of chest discomfort.      This lady is known to have a history of coronary artery disease.  She had been following with my late partner, Dr. Michael Wright.  She first presented in 10/2013 with typical chest discomfort.  She was found to have a non-Q-wave myocardial infarction, and coronary angiography demonstrated a 95% proximal right coronary artery stenosis, which was revascularized with a drug-eluting stent.  Other coronary artery lesions included a 60%-70% proximal LAD stenosis and a 50% mid circumflex artery stenosis, which were not felt to be hemodynamically significant.  She has had stress nuclear studies in 8120-2922, which demonstrated no areas of significant infarction or ischemia.  She has also had rhythm monitoring in 2015, which showed occasional PVCs and PACs, but no significant arrhythmias.  She also had a 24 hour ambulatory blood pressure, which I believe shows adequate blood pressure control.  She has had occasional chest discomfort, which was not felt by Real to be cardiac in origin.      This lady was doing well until a few days ago when she had a spontaneous onset of localized substernal chest discomfort.  This happened while she was watching television.  The discomfort was not severe and lasted for about 1/2 hour.  It was relieved by 1 sublingual nitroglycerin.  Since then, she has been well without recurrence of any chest discomfort.  Her cardiac examination is unremarkable.  Her EKG today showed sinus bradycardia and is otherwise normal as well.      IMPRESSION:   1.  Atypical chest discomfort.  There is certainly a concern for this episode being new onset angina, and I will arrange for her to have a Lexiscan nuclear study.  Exercise tolerance is limited by lower limb arthritis.   2.  Adequate blood pressure control.   3.  Dyslipidemia,  on 40 mg of atorvastatin.  Levels have been carefully followed by her primary physician.      If her stress nuclear study is normal and she does not have any further episodes of chest discomfort, I plan to follow her again in a year's time.      It has been my pleasure to be involved in this delightful lady's care.         BANDAR VALDERRAMA MD, [facility]             D: 2021   T: 2021   MT: vannessa      Name:     DYLAN RODRÍGUEZ   MRN:      [id number]        Account:      [id number]   :      1945           Service Date: 2021      Document: [id number]

## 2021-01-21 ENCOUNTER — HOSPITAL ENCOUNTER (OUTPATIENT)
Dept: NUCLEAR MEDICINE | Facility: CLINIC | Age: 76
Setting detail: NUCLEAR MEDICINE
End: 2021-01-21
Attending: INTERNAL MEDICINE
Payer: COMMERCIAL

## 2021-01-21 ENCOUNTER — HOSPITAL ENCOUNTER (OUTPATIENT)
Dept: CARDIOLOGY | Facility: CLINIC | Age: 76
End: 2021-01-21
Attending: INTERNAL MEDICINE
Payer: COMMERCIAL

## 2021-01-21 DIAGNOSIS — I20.89 STABLE ANGINA PECTORIS (H): ICD-10-CM

## 2021-01-21 DIAGNOSIS — I25.10 CORONARY ARTERY DISEASE INVOLVING NATIVE CORONARY ARTERY OF NATIVE HEART WITHOUT ANGINA PECTORIS: ICD-10-CM

## 2021-01-21 LAB
CV STRESS MAX HR HE: 131
RATE PRESSURE PRODUCT: NORMAL
STRESS ECHO BASELINE DIASTOLIC HE: 73
STRESS ECHO BASELINE HR: 85
STRESS ECHO BASELINE SYSTOLIC BP: 155
STRESS ECHO CALCULATED PERCENT HR: 92 %
STRESS ECHO LAST STRESS DIASTOLIC BP: 64
STRESS ECHO LAST STRESS SYSTOLIC BP: 165
STRESS ECHO TARGET HR: 143

## 2021-01-21 PROCEDURE — 250N000011 HC RX IP 250 OP 636

## 2021-01-21 PROCEDURE — A9502 TC99M TETROFOSMIN: HCPCS | Performed by: INTERNAL MEDICINE

## 2021-01-21 PROCEDURE — 93017 CV STRESS TEST TRACING ONLY: CPT

## 2021-01-21 PROCEDURE — 343N000001 HC RX 343: Performed by: INTERNAL MEDICINE

## 2021-01-21 PROCEDURE — 93016 CV STRESS TEST SUPVJ ONLY: CPT | Performed by: INTERNAL MEDICINE

## 2021-01-21 PROCEDURE — 78452 HT MUSCLE IMAGE SPECT MULT: CPT

## 2021-01-21 PROCEDURE — 78452 HT MUSCLE IMAGE SPECT MULT: CPT | Mod: 26 | Performed by: INTERNAL MEDICINE

## 2021-01-21 PROCEDURE — 93018 CV STRESS TEST I&R ONLY: CPT | Performed by: INTERNAL MEDICINE

## 2021-01-21 RX ORDER — ALBUTEROL SULFATE 90 UG/1
2 AEROSOL, METERED RESPIRATORY (INHALATION) EVERY 5 MIN PRN
Status: DISCONTINUED | OUTPATIENT
Start: 2021-01-21 | End: 2021-01-22 | Stop reason: HOSPADM

## 2021-01-21 RX ORDER — AMINOPHYLLINE 25 MG/ML
50-100 INJECTION, SOLUTION INTRAVENOUS
Status: DISCONTINUED | OUTPATIENT
Start: 2021-01-21 | End: 2021-01-22 | Stop reason: HOSPADM

## 2021-01-21 RX ORDER — CAFFEINE CITRATE 20 MG/ML
60 SOLUTION INTRAVENOUS
Status: DISCONTINUED | OUTPATIENT
Start: 2021-01-21 | End: 2021-01-22 | Stop reason: HOSPADM

## 2021-01-21 RX ORDER — REGADENOSON 0.08 MG/ML
INJECTION, SOLUTION INTRAVENOUS
Status: COMPLETED
Start: 2021-01-21 | End: 2021-01-21

## 2021-01-21 RX ORDER — ACYCLOVIR 200 MG/1
0-1 CAPSULE ORAL
Status: DISCONTINUED | OUTPATIENT
Start: 2021-01-21 | End: 2021-01-22 | Stop reason: HOSPADM

## 2021-01-21 RX ORDER — REGADENOSON 0.08 MG/ML
0.4 INJECTION, SOLUTION INTRAVENOUS ONCE
Status: COMPLETED | OUTPATIENT
Start: 2021-01-21 | End: 2021-01-21

## 2021-01-21 RX ADMIN — TETROFOSMIN 10.2 MCI.: 1.38 INJECTION, POWDER, LYOPHILIZED, FOR SOLUTION INTRAVENOUS at 08:57

## 2021-01-21 RX ADMIN — TETROFOSMIN 33 MCI.: 1.38 INJECTION, POWDER, LYOPHILIZED, FOR SOLUTION INTRAVENOUS at 10:16

## 2021-01-21 RX ADMIN — REGADENOSON 0.4 MG: 0.08 INJECTION, SOLUTION INTRAVENOUS at 10:15

## 2021-01-27 ENCOUNTER — TELEPHONE (OUTPATIENT)
Dept: CARDIOLOGY | Facility: CLINIC | Age: 76
End: 2021-01-27

## 2021-01-27 NOTE — TELEPHONE ENCOUNTER
Phone call to patient to inform her that her stress test showed normal blood flow and that the pain or discomfort in her chest is unlikely related to her heart.  She says that discomfort is still there and is no worse. She was happy to hear of the results and had no further questions for me at this time.    I told her that Dr. Guevara will see her back in one year and gave her our nurse line number should she have any concerns along the way.

## 2021-01-27 NOTE — TELEPHONE ENCOUNTER
----- Message from Jasson Guevara MD sent at 1/27/2021  8:45 AM CST -----  Pls let her know that stress nuc is normal.  CP likely non cardiac.  See in one year.  Thanks.

## 2021-06-01 VITALS — HEIGHT: 60 IN | BODY MASS INDEX: 25.32 KG/M2 | WEIGHT: 129 LBS

## 2021-06-16 NOTE — ANESTHESIA PROCEDURE NOTES
Peripheral Block    Patient location during procedure: pre-op  Start time: 3/2/2018 7:04 AM  End time: 3/2/2018 7:08 AM  post-op analgesia per surgeon order as noted in medical record  Staffing:  Performing  Anesthesiologist: LEESA MONTENEGRO  Preanesthetic Checklist  Completed: patient identified, site marked, risks, benefits, and alternatives discussed, timeout performed, consent obtained, at patient's request, airway assessed, oxygen available, suction available, emergency drugs available and hand hygiene performed  Peripheral Block  Block type: saphenous  Prep: ChloraPrep  Patient position: supine  Patient monitoring: cardiac monitor, continuous pulse oximetry, blood pressure and heart rate  Laterality: right  Injection technique: ultrasound guided    Ultrasound used to visualize needle placement in proximity to nerve being blocked: yes   Permanent ultrasound image captured for medical record  Sterile gel and probe cover used for ultrasound.    Needle  Needle type: Stimuplex   Needle gauge: 21 G  Needle length: 4 in  no peripheral nerve catheter placed  Assessment  Injection assessment: negative aspiration for heme, no difficulty with injection, no paresthesia on injection and incremental injection

## 2021-06-16 NOTE — ANESTHESIA PREPROCEDURE EVALUATION
Anesthesia Evaluation      Patient summary reviewed   No history of anesthetic complications     Airway   Mallampati: II  Neck ROM: full   Pulmonary - normal exam   (+) a smoker                         Cardiovascular - normal exam  (+) hypertension well controlled, CAD, CABG/stent, ,     ECG reviewed        Neuro/Psych - negative ROS     Endo/Other    (+) arthritis,   (-) no obesity     GI/Hepatic/Renal - negative ROS           Dental - normal exam                        Anesthesia Plan  Planned anesthetic: spinal and peripheral nerve block  Tibial and saphenous nerve blocks for POP per surgeon request.  Decadron, Zofran.  Diprivan infusion.  Ketamine.  ASA 2     Anesthetic plan and risks discussed with: patient  Anesthesia plan special considerations: antiemetics,   Post-op plan: routine recovery

## 2021-06-16 NOTE — ANESTHESIA POSTPROCEDURE EVALUATION
Patient: Malinda Beckham  #1 RIGHT TOTAL KNEE ARTHROPLASTY  Anesthesia type: spinal    Patient location: Phase II Recovery  Last vitals:   Vitals:    03/02/18 1030   BP: 124/58   Pulse: (!) 59   Resp: 18   Temp:    SpO2: 98%     Post vital signs: stable  Level of consciousness: awake and responds to simple questions  Post-anesthesia pain: pain controlled  Post-anesthesia nausea and vomiting: no  Pulmonary: unassisted, return to baseline  Cardiovascular: stable and blood pressure at baseline  Hydration: adequate  Anesthetic events: no    QCDR Measures:  ASA# 11 - Maura-op Cardiac Arrest: ASA11B - Patient did NOT experience unanticipated cardiac arrest  ASA# 12 - Maura-op Mortality Rate: ASA12B - Patient did NOT die  ASA# 13 - PACU Re-Intubation Rate: NA - No ETT / LMA used for case  ASA# 10 - Composite Anes Safety: ASA10A - No serious adverse event    Additional Notes: doing well, no complaints.

## 2021-06-16 NOTE — ANESTHESIA PROCEDURE NOTES
Peripheral Block    Patient location during procedure: pre-op  Start time: 3/2/2018 6:54 AM  End time: 3/2/2018 7:04 AM  post-op analgesia per surgeon order as noted in medical record  Staffing:  Performing  Anesthesiologist: LEESA MONTENEGRO  Preanesthetic Checklist  Completed: patient identified, site marked, risks, benefits, and alternatives discussed, timeout performed, consent obtained, at patient's request, airway assessed, oxygen available, suction available, emergency drugs available and hand hygiene performed  Peripheral Block  Block type: other, tibial  Prep: ChloraPrep  Patient position: supine  Patient monitoring: cardiac monitor, continuous pulse oximetry, blood pressure and heart rate  Laterality: right  Injection technique: ultrasound guided    Ultrasound used to visualize needle placement in proximity to nerve being blocked: yes   Permanent ultrasound image captured for medical record  Sterile gel and probe cover used for ultrasound.    Needle  Needle type: Stimuplex   Needle gauge: 21 G  Needle length: 4 in  no peripheral nerve catheter placed  Assessment  Injection assessment: negative aspiration for heme, no difficulty with injection, no paresthesia on injection and incremental injection

## 2021-06-16 NOTE — ANESTHESIA CARE TRANSFER NOTE
Last vitals:   Vitals:    03/02/18 0904   BP: 102/55   Pulse: 86   Resp: 16   Temp: 36.3  C (97.4  F)   SpO2: 98%     Patient's level of consciousness is drowsy  Spontaneous respirations: yes  Maintains airway independently: yes  Dentition unchanged: yes  Oropharynx: oropharynx clear of all foreign objects    QCDR Measures:  ASA# 20 - Surgical Safety Checklist: WHO surgical safety checklist completed prior to induction  PQRS# 430 - Adult PONV Prevention: 4558F - Pt received => 2 anti-emetic agents (different classes) preop & intraop  ASA# 8 - Peds PONV Prevention: NA - Not pediatric patient, not GA or 2 or more risk factors NOT present  PQRS# 424 - Maura-op Temp Management: 4559F - At least one body temp DOCUMENTED => 35.5C or 95.9F within required timeframe  PQRS# 426 - PACU Transfer Protocol: - Transfer of care checklist used  ASA# 14 - Acute Post-op Pain: ASA14B - Patient did NOT experience pain >= 7 out of 10

## 2021-06-16 NOTE — ANESTHESIA PROCEDURE NOTES
Spinal Block    Patient location during procedure: OR  Start time: 3/2/2018 7:31 AM  End time: 3/2/2018 7:35 AM  Reason for block: primary anesthetic    Staffing:  Performing  Anesthesiologist: LEESA MONTENEGRO    Preanesthetic Checklist  Completed: patient identified, risks, benefits, and alternatives discussed, timeout performed, consent obtained, airway assessed, oxygen available, suction available, emergency drugs available and hand hygiene performed  Spinal Block  Patient position: sitting  Prep: ChloraPrep and site prepped and draped  Patient monitoring: blood pressure, continuous pulse ox, cardiac monitor and heart rate  Approach: midline  Location: L3-4  Injection technique: single-shot  Needle type: pencil-tip   Needle gauge: 24 G

## 2021-11-28 ENCOUNTER — APPOINTMENT (OUTPATIENT)
Dept: CT IMAGING | Facility: CLINIC | Age: 76
End: 2021-11-28
Attending: EMERGENCY MEDICINE
Payer: COMMERCIAL

## 2021-11-28 ENCOUNTER — HOSPITAL ENCOUNTER (EMERGENCY)
Facility: CLINIC | Age: 76
Discharge: HOME | End: 2021-11-28
Attending: EMERGENCY MEDICINE | Admitting: EMERGENCY MEDICINE
Payer: COMMERCIAL

## 2021-11-28 VITALS
TEMPERATURE: 98.2 F | HEART RATE: 64 BPM | DIASTOLIC BLOOD PRESSURE: 55 MMHG | SYSTOLIC BLOOD PRESSURE: 106 MMHG | RESPIRATION RATE: 11 BRPM | OXYGEN SATURATION: 92 %

## 2021-11-28 DIAGNOSIS — R00.2 PALPITATIONS: ICD-10-CM

## 2021-11-28 DIAGNOSIS — R07.89 ATYPICAL CHEST PAIN: ICD-10-CM

## 2021-11-28 DIAGNOSIS — R91.8 GROUND GLASS OPACITY PRESENT ON IMAGING OF LUNG: ICD-10-CM

## 2021-11-28 LAB
ALBUMIN SERPL-MCNC: 3.8 G/DL (ref 3.4–5)
ALP SERPL-CCNC: 88 U/L (ref 40–150)
ALT SERPL W P-5'-P-CCNC: 27 U/L (ref 0–50)
ANION GAP SERPL CALCULATED.3IONS-SCNC: 8 MMOL/L (ref 3–14)
AST SERPL W P-5'-P-CCNC: 32 U/L (ref 0–45)
BASOPHILS # BLD AUTO: 0 10E3/UL (ref 0–0.2)
BASOPHILS NFR BLD AUTO: 0 %
BILIRUB SERPL-MCNC: 0.6 MG/DL (ref 0.2–1.3)
BUN SERPL-MCNC: 22 MG/DL (ref 7–30)
CALCIUM SERPL-MCNC: 8.7 MG/DL (ref 8.5–10.1)
CHLORIDE BLD-SCNC: 110 MMOL/L (ref 94–109)
CO2 SERPL-SCNC: 22 MMOL/L (ref 20–32)
CREAT SERPL-MCNC: 0.66 MG/DL (ref 0.52–1.04)
D DIMER PPP FEU-MCNC: 0.8 UG/ML FEU (ref 0–0.5)
EOSINOPHIL # BLD AUTO: 0.3 10E3/UL (ref 0–0.7)
EOSINOPHIL NFR BLD AUTO: 2 %
ERYTHROCYTE [DISTWIDTH] IN BLOOD BY AUTOMATED COUNT: 12.2 % (ref 10–15)
GFR SERPL CREATININE-BSD FRML MDRD: 85 ML/MIN/1.73M2
GLUCOSE BLD-MCNC: 139 MG/DL (ref 70–99)
HCT VFR BLD AUTO: 44.2 % (ref 35–47)
HGB BLD-MCNC: 14.3 G/DL (ref 11.7–15.7)
IMM GRANULOCYTES # BLD: 0.1 10E3/UL
IMM GRANULOCYTES NFR BLD: 0 %
LYMPHOCYTES # BLD AUTO: 3.2 10E3/UL (ref 0.8–5.3)
LYMPHOCYTES NFR BLD AUTO: 19 %
MCH RBC QN AUTO: 30.8 PG (ref 26.5–33)
MCHC RBC AUTO-ENTMCNC: 32.4 G/DL (ref 31.5–36.5)
MCV RBC AUTO: 95 FL (ref 78–100)
MONOCYTES # BLD AUTO: 1 10E3/UL (ref 0–1.3)
MONOCYTES NFR BLD AUTO: 6 %
NEUTROPHILS # BLD AUTO: 12.4 10E3/UL (ref 1.6–8.3)
NEUTROPHILS NFR BLD AUTO: 73 %
NRBC # BLD AUTO: 0 10E3/UL
NRBC BLD AUTO-RTO: 0 /100
PLATELET # BLD AUTO: 242 10E3/UL (ref 150–450)
POTASSIUM BLD-SCNC: 3.6 MMOL/L (ref 3.4–5.3)
PROT SERPL-MCNC: 7 G/DL (ref 6.8–8.8)
RBC # BLD AUTO: 4.64 10E6/UL (ref 3.8–5.2)
SODIUM SERPL-SCNC: 140 MMOL/L (ref 133–144)
T4 FREE SERPL-MCNC: 1.08 NG/DL (ref 0.76–1.46)
TROPONIN I SERPL HS-MCNC: 7 NG/L
TROPONIN I SERPL HS-MCNC: 8 NG/L
TROPONIN I SERPL-MCNC: <0.015 UG/L (ref 0–0.04)
TROPONIN I SERPL-MCNC: <0.015 UG/L (ref 0–0.04)
TSH SERPL DL<=0.005 MIU/L-ACNC: 4.9 MU/L (ref 0.4–4)
WBC # BLD AUTO: 17 10E3/UL (ref 4–11)

## 2021-11-28 PROCEDURE — 71275 CT ANGIOGRAPHY CHEST: CPT

## 2021-11-28 PROCEDURE — 258N000003 HC RX IP 258 OP 636: Performed by: EMERGENCY MEDICINE

## 2021-11-28 PROCEDURE — 85379 FIBRIN DEGRADATION QUANT: CPT | Performed by: EMERGENCY MEDICINE

## 2021-11-28 PROCEDURE — 84484 ASSAY OF TROPONIN QUANT: CPT | Mod: 91 | Performed by: EMERGENCY MEDICINE

## 2021-11-28 PROCEDURE — 84439 ASSAY OF FREE THYROXINE: CPT | Performed by: EMERGENCY MEDICINE

## 2021-11-28 PROCEDURE — 96361 HYDRATE IV INFUSION ADD-ON: CPT

## 2021-11-28 PROCEDURE — 85025 COMPLETE CBC W/AUTO DIFF WBC: CPT | Performed by: EMERGENCY MEDICINE

## 2021-11-28 PROCEDURE — 250N000011 HC RX IP 250 OP 636: Performed by: EMERGENCY MEDICINE

## 2021-11-28 PROCEDURE — 36415 COLL VENOUS BLD VENIPUNCTURE: CPT | Performed by: EMERGENCY MEDICINE

## 2021-11-28 PROCEDURE — 99285 EMERGENCY DEPT VISIT HI MDM: CPT | Mod: 25

## 2021-11-28 PROCEDURE — 84443 ASSAY THYROID STIM HORMONE: CPT | Performed by: EMERGENCY MEDICINE

## 2021-11-28 PROCEDURE — 250N000013 HC RX MED GY IP 250 OP 250 PS 637: Performed by: EMERGENCY MEDICINE

## 2021-11-28 PROCEDURE — 96374 THER/PROPH/DIAG INJ IV PUSH: CPT | Mod: 59

## 2021-11-28 PROCEDURE — 84484 ASSAY OF TROPONIN QUANT: CPT | Performed by: EMERGENCY MEDICINE

## 2021-11-28 PROCEDURE — 250N000009 HC RX 250: Performed by: EMERGENCY MEDICINE

## 2021-11-28 PROCEDURE — 80053 COMPREHEN METABOLIC PANEL: CPT | Performed by: EMERGENCY MEDICINE

## 2021-11-28 PROCEDURE — 93005 ELECTROCARDIOGRAM TRACING: CPT

## 2021-11-28 RX ORDER — ASPIRIN 81 MG/1
324 TABLET, CHEWABLE ORAL ONCE
Status: COMPLETED | OUTPATIENT
Start: 2021-11-28 | End: 2021-11-28

## 2021-11-28 RX ORDER — NITROGLYCERIN 0.4 MG/1
0.4 TABLET SUBLINGUAL EVERY 5 MIN PRN
Qty: 25 TABLET | Refills: 0 | Status: SHIPPED | OUTPATIENT
Start: 2021-11-28 | End: 2021-11-28

## 2021-11-28 RX ORDER — IOPAMIDOL 755 MG/ML
500 INJECTION, SOLUTION INTRAVASCULAR ONCE
Status: COMPLETED | OUTPATIENT
Start: 2021-11-28 | End: 2021-11-28

## 2021-11-28 RX ORDER — NITROGLYCERIN 0.4 MG/1
0.4 TABLET SUBLINGUAL EVERY 5 MIN PRN
Qty: 25 TABLET | Refills: 0 | Status: SHIPPED | OUTPATIENT
Start: 2021-11-28 | End: 2022-04-06

## 2021-11-28 RX ORDER — ONDANSETRON 2 MG/ML
4 INJECTION INTRAMUSCULAR; INTRAVENOUS ONCE
Status: COMPLETED | OUTPATIENT
Start: 2021-11-28 | End: 2021-11-28

## 2021-11-28 RX ORDER — LORAZEPAM 1 MG/1
1 TABLET ORAL ONCE
Status: COMPLETED | OUTPATIENT
Start: 2021-11-28 | End: 2021-11-28

## 2021-11-28 RX ADMIN — ASPIRIN 81 MG CHEWABLE TABLET 162 MG: 81 TABLET CHEWABLE at 04:25

## 2021-11-28 RX ADMIN — ONDANSETRON 4 MG: 2 INJECTION INTRAMUSCULAR; INTRAVENOUS at 04:55

## 2021-11-28 RX ADMIN — SODIUM CHLORIDE 54 ML: 9 INJECTION, SOLUTION INTRAVENOUS at 05:39

## 2021-11-28 RX ADMIN — SODIUM CHLORIDE 500 ML: 9 INJECTION, SOLUTION INTRAVENOUS at 04:25

## 2021-11-28 RX ADMIN — IOPAMIDOL 58 ML: 755 INJECTION, SOLUTION INTRAVENOUS at 05:39

## 2021-11-28 RX ADMIN — LORAZEPAM 1 MG: 1 TABLET ORAL at 04:26

## 2021-11-28 ASSESSMENT — ENCOUNTER SYMPTOMS
SHORTNESS OF BREATH: 1
COUGH: 0
FEVER: 0
PALPITATIONS: 1

## 2021-11-28 NOTE — ED PROVIDER NOTES
Patient was signed out to me by Dr. Olsen at 6am pending repeat troponin.  Please see his initial ED note for full details of her ED course and evaluation.  Repeat troponin remained unchanged.  CT scan of the chest have been obtained and reveals diffuse groundglass opacities concerning for possible viral infection including COVID-19.  Patient is vaccinated.  Patient is adamantly refusing COVID-19 testing.  I discussed with her that without testing we are unable to determine the exact cause of these findings which could represent an underlying infection.  The patient denies any fever or cough or worsening shortness of breath.  Dr. Olsen had also discussed with her being admitted to observation for ongoing monitoring evaluation and treatment of her chest discomfort given that she is high risk for adverse cardiac event with her history of prior coronary disease and heart attack.  The patient is refusing hospitalization or further work-up at this time.  She is requesting discharge to home.  Given the lack of fever, worsened cough or other clinical signs or symptoms to suggest bacterial pneumonia there is no indication for antibiotics at this time.  I discussed with the patient that she is welcome to return to the hospital at any time for any worsening of her condition whatsoever.  I stressed the importance of close outpatient primary care follow-up.  The patient was discharged in stable condition.    Clinical impression:    ICD-10-CM    1. Palpitations  R00.2    2. Atypical chest pain  R07.89    3. Ground glass opacity present on imaging of lung  R91.8         Disposition:  Discharged to home     Tay aTo MD  11/28/21 0828

## 2021-11-28 NOTE — DISCHARGE INSTRUCTIONS
You have ground glass infiltrates on your CT Scan that are commonly seen in viral infections such as COVID-19. You should quarantine at home for 14 days. Return to the Emergency Department for any worsening shortness of breath, chest pain, fevers or see your primary care physician.    Discharge Instructions  Chest Pain    You have been seen today for chest pain or discomfort.  At this time, your provider has found no signs that your chest pain is due to a serious or life-threatening condition, (or you have declined more testing and/or admission to the hospital). However, sometimes there is a serious problem that does not show up right away. Your evaluation today may not be complete and you may need further testing and evaluation.     Generally, every Emergency Department visit should have a follow-up clinic visit with either a primary or a specialty clinic/provider. Please follow-up as instructed by your emergency provider today.  Return to the Emergency Department if:  Your chest pain changes, gets worse, starts to happen more often, or comes with less activity.  You are newly short of breath.  You get very weak or tired.  You pass out or faint.  You have any new symptoms, like fever, cough, numb legs, or you cough up blood.  You have anything else that worries you.    Until you follow-up with your regular provider, please do the following:  Take one aspirin daily unless you have an allergy or are told not to by your provider.  If a stress test appointment has been made, go to the appointment.  If you have questions, contact your regular provider.  Follow-up with your regular provider/clinic as directed; this is very important.    If you were given a prescription for medicine here today, be sure to read all of the information (including the package insert) that comes with your prescription.  This will include important information about the medicine, its side effects, and any warnings that you need to know about.   The pharmacist who fills the prescription can provide more information and answer questions you may have about the medicine.  If you have questions or concerns that the pharmacist cannot address, please call or return to the Emergency Department.       Remember that you can always come back to the Emergency Department if you are not able to see your regular provider in the amount of time listed above, if you get any new symptoms, or if there is anything that worries you.    Discharge Instructions  Palpitations    Palpitations are an unusual awareness of your heartbeat. People often describe this as the heart skipping, fluttering, racing, irregular, or pounding. At this time, your provider has found no signs that your palpitations are due to a serious or life-threatening condition. However, sometimes there is a serious problem that does not show up right away.    Palpitations can be caused by caffeine, cigarettes, diet pills, energy drinks or supplements, other stimulants, and medications and street drugs. They can also be caused by anxiety, hormone conditions such as high thyroid, and other medical conditions. Sometimes they are a sign of abnormal rhythm in the heart. At this time, your provider did not find any dangerous cause of your symptoms.    Generally, every Emergency Department visit should have a follow-up clinic visit with either a primary or a specialty clinic/provider. Please follow-up as instructed by your emergency provider today.    Return to the Emergency Department if:  You get chest pain or tightness.  You are short of breath.  You get very weak or tired.  You pass out or faint.  Your heart rate is over 120 beats per minute for more than 10 minutes while you are resting.   You have anything else that worries you.    What can I do to help myself?  Fill any prescriptions the provider gave you and take them right away.   Follow your provider s instructions about the prescription medicines you are on.  Sometimes the provider may tell you to stop taking a medicine or change the dose.  If you smoke, this may be a good time to quit! The less you can smoke, the better.  Do not use energy drinks, diet pills, or stimulants. Limit your use of caffeine.  If you were given a prescription for medicine here today, be sure to read all of the information (including the package insert) that comes with your prescription.  This will include important information about the medicine, its side effects, and any warnings that you need to know about.  The pharmacist who fills the prescription can provide more information and answer questions you may have about the medicine.  If you have questions or concerns that the pharmacist cannot address, please call or return to the Emergency Department.     Remember that you can always come back to the Emergency Department if you are not able to see your regular provider in the amount of time listed above, if you get any new symptoms, or if there is anything that worries you.

## 2021-11-28 NOTE — ED PROVIDER NOTES
History   Chief Complaint:  Chest Pain       HPI   Malinda Beckham is a 76 year old female with history of CAD who presents with chest pain. The patient stated that she woke up this morning with a pressure in her chest, some palpitations and shortness of breath. She stated having a similar episode 2 days ago which resolved using nitroglycerine. This time she tried nitro with no significant improvement. She denies fever, cough.         Hx of stent x1     Known blockage in coronary a    Here with episode of not geeling right    At 330 woke up pressure in chest    Took nitro, some shortness of breath and palpitations     Similar episode on THursday, that went away after nitro,denies fever cough           Pt states chest pain and palpitations tonight. Pt states symptoms improved temporarily with nitroglycerin. Pt states symptoms similar to previous MI. ABCs intact GCS 15       Review of Systems   Constitutional: Negative for fever.   Respiratory: Positive for shortness of breath. Negative for cough.    Cardiovascular: Positive for chest pain and palpitations.   All other systems reviewed and are negative.    Allergies:  Lisinopril    Medications:  Ascorbic Acid   aspirin 81  atorvastatin   Biotin   cyanocobalamin 1000 MCG SUBL  cycloSPORINE   metoprolol   Naproxen Sodium   nitroglycerin     Past Medical History:    Coronary artery disease    Hypertension  Myocardial infarction  Osteoarthritis   NSTEMI  Hypercholesterolemia   Right coronary artery stent placement     Past Surgical History:    C TOTAL KNEE ARTHROPLASTY Right 3/2/2018  RIGHT TOTAL KNEE ARTHROPLASTY  CATARACT EXTRACTION Right   CORONARY STENT PLACEMENT  2013   LEFT HEART CATHETERIZATION  10/18/13  tent to RCA  lens implant surgery      Family History:    Coronary artery disease  Father mother brother    Social History:  The patient was brought to the emergency department by EMS.  The patient presents to the emergency department alone.    Physical Exam      Patient Vitals for the past 24 hrs:   BP Temp Temp src Pulse Resp SpO2   11/28/21 0530 -- -- -- 77 11 98 %   11/28/21 0500 132/66 -- -- 81 24 100 %   11/28/21 0430 136/74 -- -- 92 11 96 %   11/28/21 0410 137/83 -- -- 112 -- 95 %   11/28/21 0357 134/84 98.2  F (36.8  C) Oral 109 20 99 %       Physical Exam  Constitutional:  Oriented to person, place, and time. Well appearing.  HENT:   Head:    Normocephalic.   Mouth/Throat:   Oropharynx is clear and moist.   Eyes:    EOM are normal. Pupils are equal, round, and reactive to light.   Neck:    Neck supple.   Cardiovascular:  Normal rate, regular rhythm and normal heart sounds.      Exam reveals no gallop and no friction rub.       No murmur heard.  Pulmonary/Chest:  Effort normal and breath sounds normal.      No respiratory distress. No wheezes. No rales.      No reproducible chest wall pain.  Abdominal:   Soft. No distension. No tenderness. No rebound and no guarding.   Musculoskeletal:  Normal range of motion. 2+ distal equal pulses.  No leg calf tenderness, swelling or edema.  Neurological:   Alert and oriented to person, place, and time.           Moves all 4 extremities spontaneously    Skin:    No rash noted. No pallor.     Emergency Department Course     ECG:  ECG NSR rate 108, non specific T wave changes  QTC  463, abnormal EKG   ECG 2 NSR rate 76, non specific T wave changes  QTC  441, abnormal EKG     Imaging:  CT Chest Pulmonary Embolism w Contrast  1.  No pulmonary embolus, aortic dissection, or aneurysm.  2.  Mildly enlarged heart with coronary artery disease and atherosclerotic vascular disease.  3.  Mosaic attenuation groundglass opacity, may reflect air trapping, correlate to exclude infectious or inflammatory causes of bronchiolitis.    Report per radiology    Laboratory:  Labs Ordered and Resulted from Time of ED Arrival to Time of ED Departure   COMPREHENSIVE METABOLIC PANEL - Abnormal       Result Value    Sodium 140      Potassium 3.6       Chloride 110 (*)     Carbon Dioxide (CO2) 22      Anion Gap 8      Urea Nitrogen 22      Creatinine 0.66      Calcium 8.7      Glucose 139 (*)     Alkaline Phosphatase 88      AST 32      ALT 27      Protein Total 7.0      Albumin 3.8      Bilirubin Total 0.6      GFR Estimate 85     D DIMER QUANTITATIVE - Abnormal    D-Dimer Quantitative 0.80 (*)    TSH WITH FREE T4 REFLEX - Abnormal    TSH 4.90 (*)    CBC WITH PLATELETS AND DIFFERENTIAL - Abnormal    WBC Count 17.0 (*)     RBC Count 4.64      Hemoglobin 14.3      Hematocrit 44.2      MCV 95      MCH 30.8      MCHC 32.4      RDW 12.2      Platelet Count 242      % Neutrophils 73      % Lymphocytes 19      % Monocytes 6      % Eosinophils 2      % Basophils 0      % Immature Granulocytes 0      NRBCs per 100 WBC 0      Absolute Neutrophils 12.4 (*)     Absolute Lymphocytes 3.2      Absolute Monocytes 1.0      Absolute Eosinophils 0.3      Absolute Basophils 0.0      Absolute Immature Granulocytes 0.1 (*)     Absolute NRBCs 0.0     TROPONIN I - Normal    Troponin I High Sensitivity 7     TROPONIN I - Normal    Troponin I <0.015     T4 FREE - Normal    Free T4 1.08     TROPONIN I            Emergency Department Course:    Reviewed:  I reviewed nursing notes, vitals and past medical history    Assessments/Consults  0502 I obtained history and examined the patient as noted above.     Interventions:  Medications   aspirin (ASA) chewable tablet 324 mg (162 mg Oral Given 11/28/21 0425)   LORazepam (ATIVAN) tablet 1 mg (1 mg Oral Given 11/28/21 0426)   0.9% sodium chloride BOLUS (0 mLs Intravenous Stopped 11/28/21 0456)   ondansetron (ZOFRAN) injection 4 mg (4 mg Intravenous Given 11/28/21 0455)   CT scan flush (54 mLs As instructed Given 11/28/21 0539)   iopamidol (ISOVUE-370) solution 500 mL (58 mLs Intravenous Given 11/28/21 0539)     Disposition:  Care of the patient was transferred to my colleague Dr. Tao pending troponin.       Impression & Plan         Medical  Decision Makin y/o Female, hx of Stent c/o vague chest pressure, dizziness, palpitations. Differential includes ACS, PE, anxiety, gastritis or other causes. Ekg is non specific with no acute changes and she is chest pain free. CT is negative for PE show ground glass opacities. Malinda refuses Covid testing and has no symptoms. I recommended admission as she is high risk per HEART score but she refuses. She understands that she is high risk and could have a MI and the morbidity and mortality that could go along with that. She was competent to refuse. 2nd troponin is pending and case is signed out to Dr. Tao who will follow up on this.      Covid-19  Malinda Beckham was evaluated during a global COVID-19 pandemic, which necessitated consideration that the patient might be at risk for infection with the SARS-CoV-2 virus that causes COVID-19.   Applicable protocols for evaluation were followed during the patient's care.   COVID-19 was considered as part of the patient's evaluation. The plan for testing is:  a test was obtained during this visit.  a test was obtained at a previous visit and reviewed & considered today.  the patient was referred for outpatient testing.        Diagnosis:    ICD-10-CM    1. Palpitations  R00.2    2. Atypical chest pain  R07.89        Discharge Medications:  New Prescriptions    No medications on file       Scribe Disclosure:  Juan Jose RODRIGUEZ, am serving as a scribe at 4:18 AM on 2021 to document services personally performed by Tay Olsen MD based on my observations and the provider's statements to me.           Tay Olsen MD  21 0936

## 2021-11-29 LAB
ATRIAL RATE - MUSE: 108 BPM
ATRIAL RATE - MUSE: 76 BPM
DIASTOLIC BLOOD PRESSURE - MUSE: NORMAL MMHG
DIASTOLIC BLOOD PRESSURE - MUSE: NORMAL MMHG
INTERPRETATION ECG - MUSE: NORMAL
INTERPRETATION ECG - MUSE: NORMAL
P AXIS - MUSE: 41 DEGREES
P AXIS - MUSE: 42 DEGREES
PR INTERVAL - MUSE: 158 MS
PR INTERVAL - MUSE: 206 MS
QRS DURATION - MUSE: 74 MS
QRS DURATION - MUSE: 76 MS
QT - MUSE: 346 MS
QT - MUSE: 392 MS
QTC - MUSE: 441 MS
QTC - MUSE: 463 MS
R AXIS - MUSE: -10 DEGREES
R AXIS - MUSE: -10 DEGREES
SYSTOLIC BLOOD PRESSURE - MUSE: NORMAL MMHG
SYSTOLIC BLOOD PRESSURE - MUSE: NORMAL MMHG
T AXIS - MUSE: 33 DEGREES
T AXIS - MUSE: 65 DEGREES
VENTRICULAR RATE- MUSE: 108 BPM
VENTRICULAR RATE- MUSE: 76 BPM

## 2022-03-07 ENCOUNTER — TELEPHONE (OUTPATIENT)
Dept: CARDIOLOGY | Facility: CLINIC | Age: 77
End: 2022-03-07
Payer: COMMERCIAL

## 2022-03-07 NOTE — PROGRESS NOTES
HISTORY OF PRESENT ILLNESS:    This is a 76 year old female who follows with Dr. Guevara at [facility]   Her past medical history includes:  Coronary artery disease, hypertension, and hyperlipidemia:    Ms Beckham suffered a NSTEMI (2013) and underwent RCA stenting.  She was noted to have remaining moderate proximal LAD and mid CFX disease.  ECHO showed LVEF 55-60% with inferolateral hypokinesis, normal RV function, and no significant valvular pathology.  A follow up stress NUC (2013) did not show any ischemia/infarction     Holter monitor (2015) showed sinus rhythm with a 2% PVC burden.    When seen in clinic last year, she complained of some atypical-type chest pains  A Karrie NUC stress test (1/2021) did not show any stress-induced ischemia/infarction  LVEF 70%    She was seen in the ED (11/2021) for nocturnal chest pain, palpitations, and shortness of breath. EKG showed sinus rhythm without any significant ST abnormalities   Troponin normal  Chest CT showed mosaic attenuation groundglass opacity possibly concerning for infectious process  WBC 17  She refused COVID testing and being admitted for overnight observation.     Ms Beckham comes into the clinic today with a family member  She states that she has intermittent epigastric pain which at times radiates up to her left chest.  These only come on at rest and at times are relieved with TUMS  She has been taking ASA and SL NTG to relieve her pain.  These have been occurring about twice a week since she was seen in the ED. At times she has noticed palpitations with the pain  She denies any prolonged palpitations or associated lightheadedness or diaphoresis.  She never has any chest pain with exertion.  She has not noted any progression of her pain. She denies any significant shortness of breath or peripheral edema  Her activity is limited to her knee pain and she is awaiting knee surgery.  She states that she takes Aleve and multiple ASA 81 mg every day due to her  knee pain to be mobile.    She has checked her BP at home and has noted some SBP > 140 mmHg    I reviewed her EKG today  This showed Sinus bradycardia  HR 56 bpm  QRS 86 ms, QTc 415 ms  Poor R-wave progression  No significant ST abnormalities.      VITAL SIGNS:  BP: 138/64  Pulse: 58  Weight:  130 lbs (stable)  BMI: 25    IMPRESSION AND PLAN:    Coronary Artery Disease:  -s/p NSTEMI and stenting to RCA (2013)  -known remaining moderate proximal LAD and mid CFX disease  -stress NUC (1/2021) did not show any ischemia/infarct  LVEF 70%  - atypical CP relieved with TUMS and SL NTG.  Will arrange Karrie NUC and check troponin, Hgb,  -she is to seek emergency services with progression of pain  Advised on limiting Aleve    Hypertension:  -on Metoprolol 25 mg BID  -BP borderline elevated  -will add Losartan 25 mg    Palpitations:  -will arrange ZioPatch monitor  -will check TSH, Magnesium, CMP and review over the phone    Hyperlipidemia:  -on Atorvastatin 40 mg  -will check lipid panel    The total time for the visit today was 40 minutes which includes patient visit, reviewing of records, discussion, and placing of orders of the outpatient coordination of cardiovascular care as described.  The level of medical decision making during this visit was of high complexity.  Thank you for allowing me to participate in their care.    Orders Placed This Encounter   Procedures     Hemoglobin     Comprehensive metabolic panel     Lipid Profile     TSH with free T4 reflex     Magnesium     Troponin I     Follow-Up with Cardiology NIKI     EKG 12-lead complete w/read - Clinics (performed today)     Leadless EKG Monitor 3 to 7 Days       Orders Placed This Encounter   Medications     losartan (COZAAR) 25 MG tablet     Sig: Take 1 tablet (25 mg) by mouth daily     Dispense:  90 tablet     Refill:  0       There are no discontinued medications.      Encounter Diagnoses   Name Primary?     Coronary artery disease involving native coronary  artery of native heart without angina pectoris Yes     Palpitations      Other chest pain      Benign essential hypertension        CURRENT MEDICATIONS:  Current Outpatient Medications   Medication Sig Dispense Refill     aspirin (ASA) 81 MG EC tablet Take 2 tablets (162 mg) by mouth At Bedtime 1 tablet 0     atorvastatin (LIPITOR) 40 MG tablet Take 40 mg by mouth every evening        losartan (COZAAR) 25 MG tablet Take 1 tablet (25 mg) by mouth daily 90 tablet 0     metoprolol (LOPRESSOR) 50 MG tablet Take 25 mg by mouth 2 times daily  60 tablet      nitroglycerin (NITROSTAT) 0.4 MG SL tablet Place 1 tablet (0.4 mg) under the tongue every 5 minutes as needed for chest pain 25 tablet 0     nitroGLYcerin (NITROSTAT) 0.4 MG sublingual tablet Place 1 tablet (0.4 mg) under the tongue every 5 minutes as needed for chest pain (CALL 911 IF NOT IMPROVED AFTER THREE CONSECUTIVE DOSES) 25 tablet 0     Ascorbic Acid (VITAMIN C PO) Take 500 mg by mouth daily        Biotin 1 MG CAPS Take by mouth daily       CALCIUM-VITAMIN D PO Take 1 tablet by mouth daily       cyanocobalamin 1000 MCG SUBL Place 1,000 mcg under the tongue daily       cycloSPORINE (RESTASIS) 0.05 % ophthalmic emulsion Place 1 drop into both eyes 2 times daily       MAGNESIUM PO Take 1 tablet by mouth daily       melatonin 5 MG tablet Take 5 mg by mouth every evening       Naproxen Sodium (ALEVE PO) Take 550 mg by mouth 2 times daily (with meals)        UNABLE TO FIND Vit D3 25 mg once a day       VITAMIN E PO Take 400 Units by mouth daily          ALLERGIES     Allergies   Allergen Reactions     Lisinopril Cough     adverse reaction       PAST MEDICAL HISTORY:  Past Medical History:   Diagnosis Date     Coronary artery disease     stent 2013     Coronary artery disease involving native coronary artery of native heart without angina pectoris 12/19/2016     Essential hypertension 12/19/2016     Myocardial infarction (H)     NSTEMI 2013     NSTEMI (non-ST elevated  myocardial infarction) (H) 10/18/2013     Osteoarthritis of knee      Pure hypercholesterolemia      S/P right coronary artery (RCA) stent placement 12/19/2017       PAST SURGICAL HISTORY:  Past Surgical History:   Procedure Laterality Date     CATARACT EXTRACTION Right      CORONARY STENT PLACEMENT  2013     HC LEFT HEART CATHETERIZATION  10/18/13    Stent to RCA     lens implant surgery       ZZC TOTAL KNEE ARTHROPLASTY Right 3/2/2018    Procedure: RIGHT TOTAL KNEE ARTHROPLASTY;  Surgeon: Vinnie De Santiago MD;  Location: [facility] OR;  Service: Orthopedics       FAMILY HISTORY:  Family History   Problem Relation Age of Onset     Cerebrovascular Disease Mother 79        possible MI     Hypertension Mother      C.A.D. Father 59        MI     C.A.D. Brother         2 stents     C.A.D. Other         MI     Cancer Other 69        lung       SOCIAL HISTORY:  Social History     Socioeconomic History     Marital status:      Spouse name: Not on file     Number of children: Not on file     Years of education: Not on file     Highest education level: Not on file   Occupational History     Not on file   Tobacco Use     Smoking status: Former Smoker     Smokeless tobacco: Never Used     Tobacco comment: 1967   Substance and Sexual Activity     Alcohol use: Yes     Alcohol/week: 0.0 standard drinks     Comment: 1-2 a day.     Drug use: No     Sexual activity: Not on file   Other Topics Concern     Parent/sibling w/ CABG, MI or angioplasty before 65F 55M? Not Asked      Service Not Asked     Blood Transfusions Not Asked     Caffeine Concern No     Comment: 1-2  mug mornings     Occupational Exposure Not Asked     Hobby Hazards Not Asked     Sleep Concern No     Comment: does not sleep well     Stress Concern Not Asked     Weight Concern Not Asked     Special Diet No     Back Care Not Asked     Exercise No     Comment: due to knees     Bike Helmet Not Asked     Seat Belt Yes     Self-Exams Not Asked    Social History Narrative     Not on file     Social Determinants of Health     Financial Resource Strain: Not on file   Food Insecurity: Not on file   Transportation Needs: Not on file   Physical Activity: Not on file   Stress: Not on file   Social Connections: Not on file   Intimate Partner Violence: Not on file   Housing Stability: Not on file       Review of Systems:  Skin:  not assessed       Eyes:  not assessed      ENT:  not assessed      Respiratory:          Cardiovascular:    palpitations;chest pain;Positive for;fatigue;lightheadedness nitro and baby asprin working for symptoms. feels like a nause with it. sx 1-2 times a day  Gastroenterology: Positive for nausea    Genitourinary:  not assessed      Musculoskeletal:  not assessed      Neurologic:  Positive for headaches    Psychiatric:  not assessed      Heme/Lymph/Imm:  not assessed      Endocrine:  not assessed        Physical Exam:  Vitals: /64 (BP Location: Right arm, Patient Position: Sitting, Cuff Size: Adult Regular)   Pulse 58   Ht 1.524 m (5')   Wt 59 kg (130 lb)   LMP  (LMP Unknown)   SpO2 99%   BMI 25.39 kg/m      Constitutional:  cooperative        Skin:  warm and dry to the touch          Head:  normocephalic        Eyes:  pupils equal and round        Lymph:      ENT:  no pallor or cyanosis        Neck:  JVP normal;no carotid bruit        Respiratory:  clear to auscultation;normal respiratory excursion    fewe rales in left base otherwise clear to auscultation    Cardiac: regular rhythm;normal S1 and S2;apical impulse not displaced   S4   systolic ejection murmur;grade 1;LLSB;radiation to the RUSB        pulses full and equal                                        GI:  abdomen soft        Extremities and Muscular Skeletal:  no edema arthritic deformities of LE            Neurological:  affect appropriate   walks with a cane    Psych:  Alert and Oriented x 3          CC  Jasson Guevara MD  [address]  [place]  [id number]

## 2022-03-07 NOTE — TELEPHONE ENCOUNTER
Patient states she has had problems with palpitations since Thanksgiving.  She has also having problems with heartburn for the last couple months which is relieved with 2 baby ASA.  She was due for a follow up in January and just got around to calling and soonest appt with Dr. Guevara was in April and wanted to be seen sooner. Has not seen a new PCP saw someone once but did not care for her after Dr. Conte left [facility].

## 2022-03-08 ENCOUNTER — LAB (OUTPATIENT)
Dept: LAB | Facility: CLINIC | Age: 77
End: 2022-03-08
Payer: COMMERCIAL

## 2022-03-08 ENCOUNTER — OFFICE VISIT (OUTPATIENT)
Dept: CARDIOLOGY | Facility: CLINIC | Age: 77
End: 2022-03-08
Payer: COMMERCIAL

## 2022-03-08 VITALS
WEIGHT: 130 LBS | HEART RATE: 58 BPM | SYSTOLIC BLOOD PRESSURE: 138 MMHG | BODY MASS INDEX: 25.52 KG/M2 | OXYGEN SATURATION: 99 % | HEIGHT: 60 IN | DIASTOLIC BLOOD PRESSURE: 64 MMHG

## 2022-03-08 DIAGNOSIS — I25.10 CORONARY ARTERY DISEASE INVOLVING NATIVE CORONARY ARTERY OF NATIVE HEART WITHOUT ANGINA PECTORIS: ICD-10-CM

## 2022-03-08 DIAGNOSIS — R07.89 OTHER CHEST PAIN: ICD-10-CM

## 2022-03-08 DIAGNOSIS — R00.2 PALPITATIONS: ICD-10-CM

## 2022-03-08 DIAGNOSIS — I25.10 CORONARY ARTERY DISEASE INVOLVING NATIVE CORONARY ARTERY OF NATIVE HEART WITHOUT ANGINA PECTORIS: Primary | ICD-10-CM

## 2022-03-08 DIAGNOSIS — I10 BENIGN ESSENTIAL HYPERTENSION: ICD-10-CM

## 2022-03-08 LAB
ALBUMIN SERPL-MCNC: 3.6 G/DL (ref 3.4–5)
ALP SERPL-CCNC: 84 U/L (ref 40–150)
ALT SERPL W P-5'-P-CCNC: 32 U/L (ref 0–50)
ANION GAP SERPL CALCULATED.3IONS-SCNC: 4 MMOL/L (ref 3–14)
AST SERPL W P-5'-P-CCNC: 28 U/L (ref 0–45)
BILIRUB SERPL-MCNC: 0.9 MG/DL (ref 0.2–1.3)
BUN SERPL-MCNC: 18 MG/DL (ref 7–30)
CALCIUM SERPL-MCNC: 9.5 MG/DL (ref 8.5–10.1)
CHLORIDE BLD-SCNC: 108 MMOL/L (ref 94–109)
CHOLEST SERPL-MCNC: 116 MG/DL
CO2 SERPL-SCNC: 27 MMOL/L (ref 20–32)
CREAT SERPL-MCNC: 0.78 MG/DL (ref 0.52–1.04)
FASTING STATUS PATIENT QL REPORTED: YES
GFR SERPL CREATININE-BSD FRML MDRD: 77 ML/MIN/1.73M2
GLUCOSE BLD-MCNC: 102 MG/DL (ref 70–99)
HDLC SERPL-MCNC: 68 MG/DL
HGB BLD-MCNC: 13.7 G/DL (ref 11.7–15.7)
LDLC SERPL CALC-MCNC: 29 MG/DL
MAGNESIUM SERPL-MCNC: 2.5 MG/DL (ref 1.6–2.3)
NONHDLC SERPL-MCNC: 48 MG/DL
POTASSIUM BLD-SCNC: 4.6 MMOL/L (ref 3.4–5.3)
PROT SERPL-MCNC: 6.9 G/DL (ref 6.8–8.8)
SODIUM SERPL-SCNC: 139 MMOL/L (ref 133–144)
TRIGL SERPL-MCNC: 96 MG/DL
TROPONIN I SERPL HS-MCNC: 8 NG/L
TSH SERPL DL<=0.005 MIU/L-ACNC: 2.8 MU/L (ref 0.4–4)

## 2022-03-08 PROCEDURE — 80061 LIPID PANEL: CPT

## 2022-03-08 PROCEDURE — 93000 ELECTROCARDIOGRAM COMPLETE: CPT | Performed by: NURSE PRACTITIONER

## 2022-03-08 PROCEDURE — 84443 ASSAY THYROID STIM HORMONE: CPT

## 2022-03-08 PROCEDURE — 80053 COMPREHEN METABOLIC PANEL: CPT

## 2022-03-08 PROCEDURE — 85018 HEMOGLOBIN: CPT

## 2022-03-08 PROCEDURE — 36415 COLL VENOUS BLD VENIPUNCTURE: CPT

## 2022-03-08 PROCEDURE — 99215 OFFICE O/P EST HI 40 MIN: CPT | Performed by: NURSE PRACTITIONER

## 2022-03-08 PROCEDURE — 83735 ASSAY OF MAGNESIUM: CPT

## 2022-03-08 PROCEDURE — 84484 ASSAY OF TROPONIN QUANT: CPT

## 2022-03-08 RX ORDER — LOSARTAN POTASSIUM 25 MG/1
25 TABLET ORAL DAILY
Qty: 90 TABLET | Refills: 0 | Status: SHIPPED | OUTPATIENT
Start: 2022-03-08 | End: 2022-04-06

## 2022-03-08 NOTE — LETTER
3/8/2022    Minesh Conte MD  [facility] [address] [place] [address]    RE: Malinda Beckham       Dear Colleague,     I had the pleasure of seeing Malinda Beckham in the [facility].  HISTORY OF PRESENT ILLNESS:    This is a 76 year old female who follows with Dr. Guevara at [facility]   Her past medical history includes:  Coronary artery disease, hypertension, and hyperlipidemia:    Ms Beckham suffered a NSTEMI (2013) and underwent RCA stenting.  She was noted to have remaining moderate proximal LAD and mid CFX disease.  ECHO showed LVEF 55-60% with inferolateral hypokinesis, normal RV function, and no significant valvular pathology.  A follow up stress NUC (2013) did not show any ischemia/infarction     Holter monitor (2015) showed sinus rhythm with a 2% PVC burden.    When seen in clinic last year, she complained of some atypical-type chest pains  A Karrie NUC stress test (1/2021) did not show any stress-induced ischemia/infarction  LVEF 70%    She was seen in the ED (11/2021) for nocturnal chest pain, palpitations, and shortness of breath. EKG showed sinus rhythm without any significant ST abnormalities   Troponin normal  Chest CT showed mosaic attenuation groundglass opacity possibly concerning for infectious process  WBC 17  She refused COVID testing and being admitted for overnight observation.     Ms Beckham comes into the clinic today with a family member  She states that she has intermittent epigastric pain which at times radiates up to her left chest.  These only come on at rest and at times are relieved with TUMS  She has been taking ASA and SL NTG to relieve her pain.  These have been occurring about twice a week since she was seen in the ED. At times she has noticed palpitations with the pain  She denies any prolonged palpitations or associated lightheadedness or diaphoresis.  She never has any chest pain with exertion.  She has not noted any progression of  her pain. She denies any significant shortness of breath or peripheral edema  Her activity is limited to her knee pain and she is awaiting knee surgery.  She states that she takes Aleve and multiple ASA 81 mg every day due to her knee pain to be mobile.    She has checked her BP at home and has noted some SBP > 140 mmHg    I reviewed her EKG today  This showed Sinus bradycardia  HR 56 bpm  QRS 86 ms, QTc 415 ms  Poor R-wave progression  No significant ST abnormalities.      VITAL SIGNS:  BP: 138/64  Pulse: 58  Weight:  130 lbs (stable)  BMI: 25    IMPRESSION AND PLAN:    Coronary Artery Disease:  -s/p NSTEMI and stenting to RCA (2013)  -known remaining moderate proximal LAD and mid CFX disease  -stress NUC (1/2021) did not show any ischemia/infarct  LVEF 70%  - atypical CP relieved with TUMS and SL NTG.  Will arrange Karrie NUC and check troponin, Hgb,  -she is to seek emergency services with progression of pain  Advised on limiting Aleve    Hypertension:  -on Metoprolol 25 mg BID  -BP borderline elevated  -will add Losartan 25 mg    Palpitations:  -will arrange ZioPatch monitor  -will check TSH, Magnesium, CMP and review over the phone    Hyperlipidemia:  -on Atorvastatin 40 mg  -will check lipid panel    The total time for the visit today was 40 minutes which includes patient visit, reviewing of records, discussion, and placing of orders of the outpatient coordination of cardiovascular care as described.  The level of medical decision making during this visit was of high complexity.  Thank you for allowing me to participate in their care.    Orders Placed This Encounter   Procedures     Hemoglobin     Comprehensive metabolic panel     Lipid Profile     TSH with free T4 reflex     Magnesium     Troponin I     Follow-Up with Cardiology NIKI     EKG 12-lead complete w/read - Clinics (performed today)     Leadless EKG Monitor 3 to 7 Days       Orders Placed This Encounter   Medications     losartan (COZAAR) 25 MG tablet      Sig: Take 1 tablet (25 mg) by mouth daily     Dispense:  90 tablet     Refill:  0       There are no discontinued medications.      Encounter Diagnoses   Name Primary?     Coronary artery disease involving native coronary artery of native heart without angina pectoris Yes     Palpitations      Other chest pain      Benign essential hypertension        CURRENT MEDICATIONS:  Current Outpatient Medications   Medication Sig Dispense Refill     aspirin (ASA) 81 MG EC tablet Take 2 tablets (162 mg) by mouth At Bedtime 1 tablet 0     atorvastatin (LIPITOR) 40 MG tablet Take 40 mg by mouth every evening        losartan (COZAAR) 25 MG tablet Take 1 tablet (25 mg) by mouth daily 90 tablet 0     metoprolol (LOPRESSOR) 50 MG tablet Take 25 mg by mouth 2 times daily  60 tablet      nitroglycerin (NITROSTAT) 0.4 MG SL tablet Place 1 tablet (0.4 mg) under the tongue every 5 minutes as needed for chest pain 25 tablet 0     nitroGLYcerin (NITROSTAT) 0.4 MG sublingual tablet Place 1 tablet (0.4 mg) under the tongue every 5 minutes as needed for chest pain (CALL 911 IF NOT IMPROVED AFTER THREE CONSECUTIVE DOSES) 25 tablet 0     Ascorbic Acid (VITAMIN C PO) Take 500 mg by mouth daily        Biotin 1 MG CAPS Take by mouth daily       CALCIUM-VITAMIN D PO Take 1 tablet by mouth daily       cyanocobalamin 1000 MCG SUBL Place 1,000 mcg under the tongue daily       cycloSPORINE (RESTASIS) 0.05 % ophthalmic emulsion Place 1 drop into both eyes 2 times daily       MAGNESIUM PO Take 1 tablet by mouth daily       melatonin 5 MG tablet Take 5 mg by mouth every evening       Naproxen Sodium (ALEVE PO) Take 550 mg by mouth 2 times daily (with meals)        UNABLE TO FIND Vit D3 25 mg once a day       VITAMIN E PO Take 400 Units by mouth daily          ALLERGIES     Allergies   Allergen Reactions     Lisinopril Cough     adverse reaction       PAST MEDICAL HISTORY:  Past Medical History:   Diagnosis Date     Coronary artery disease     stent  2013     Coronary artery disease involving native coronary artery of native heart without angina pectoris 12/19/2016     Essential hypertension 12/19/2016     Myocardial infarction (H)     NSTEMI 2013     NSTEMI (non-ST elevated myocardial infarction) (H) 10/18/2013     Osteoarthritis of knee      Pure hypercholesterolemia      S/P right coronary artery (RCA) stent placement 12/19/2017       PAST SURGICAL HISTORY:  Past Surgical History:   Procedure Laterality Date     CATARACT EXTRACTION Right      CORONARY STENT PLACEMENT  2013     HC LEFT HEART CATHETERIZATION  10/18/13    Stent to RCA     lens implant surgery       ZZC TOTAL KNEE ARTHROPLASTY Right 3/2/2018    Procedure: RIGHT TOTAL KNEE ARTHROPLASTY;  Surgeon: Vinnie De Santiago MD;  Location: [facility] OR;  Service: Orthopedics       FAMILY HISTORY:  Family History   Problem Relation Age of Onset     Cerebrovascular Disease Mother 79        possible MI     Hypertension Mother      C.A.D. Father 59        MI     C.A.D. Brother         2 stents     C.A.D. Other         MI     Cancer Other 69        lung       SOCIAL HISTORY:  Social History     Socioeconomic History     Marital status:      Spouse name: Not on file     Number of children: Not on file     Years of education: Not on file     Highest education level: Not on file   Occupational History     Not on file   Tobacco Use     Smoking status: Former Smoker     Smokeless tobacco: Never Used     Tobacco comment: 1967   Substance and Sexual Activity     Alcohol use: Yes     Alcohol/week: 0.0 standard drinks     Comment: 1-2 a day.     Drug use: No     Sexual activity: Not on file   Other Topics Concern     Parent/sibling w/ CABG, MI or angioplasty before 65F 55M? Not Asked      Service Not Asked     Blood Transfusions Not Asked     Caffeine Concern No     Comment: 1-2  mug mornings     Occupational Exposure Not Asked     Hobby Hazards Not Asked     Sleep Concern No     Comment: does not  sleep well     Stress Concern Not Asked     Weight Concern Not Asked     Special Diet No     Back Care Not Asked     Exercise No     Comment: due to knees     Bike Helmet Not Asked     Seat Belt Yes     Self-Exams Not Asked   Social History Narrative     Not on file     Social Determinants of Health     Financial Resource Strain: Not on file   Food Insecurity: Not on file   Transportation Needs: Not on file   Physical Activity: Not on file   Stress: Not on file   Social Connections: Not on file   Intimate Partner Violence: Not on file   Housing Stability: Not on file       Review of Systems:  Skin:  not assessed       Eyes:  not assessed      ENT:  not assessed      Respiratory:          Cardiovascular:    palpitations;chest pain;Positive for;fatigue;lightheadedness nitro and baby asprin working for symptoms. feels like a nause with it. sx 1-2 times a day  Gastroenterology: Positive for nausea    Genitourinary:  not assessed      Musculoskeletal:  not assessed      Neurologic:  Positive for headaches    Psychiatric:  not assessed      Heme/Lymph/Imm:  not assessed      Endocrine:  not assessed        Physical Exam:  Vitals: /64 (BP Location: Right arm, Patient Position: Sitting, Cuff Size: Adult Regular)   Pulse 58   Ht 1.524 m (5')   Wt 59 kg (130 lb)   LMP  (LMP Unknown)   SpO2 99%   BMI 25.39 kg/m      Constitutional:  cooperative        Skin:  warm and dry to the touch          Head:  normocephalic        Eyes:  pupils equal and round        Lymph:      ENT:  no pallor or cyanosis        Neck:  JVP normal;no carotid bruit        Respiratory:  clear to auscultation;normal respiratory excursion    fewe rales in left base otherwise clear to auscultation    Cardiac: regular rhythm;normal S1 and S2;apical impulse not displaced   S4   systolic ejection murmur;grade 1;LLSB;radiation to the RUSB        pulses full and equal                                        GI:  abdomen soft        Extremities and  Muscular Skeletal:  no edema arthritic deformities of LE            Neurological:  affect appropriate   walks with a cane    Psych:  Alert and Oriented x 3      Thank you for allowing me to participate in the care of your patient.      Sincerely,     MARYJO Echeverria [facility]  cc:   Jasson Guevara MD  [address]  [place]  [place] [id number]

## 2022-03-08 NOTE — PATIENT INSTRUCTIONS
Get labs today  Start Losartan for your blood pressure  Arrange stress test  Get heart monitor placed    Return in 3-4 wks  Go to emergency with worsening symptoms

## 2022-03-09 ENCOUNTER — TELEPHONE (OUTPATIENT)
Dept: CARDIOLOGY | Facility: CLINIC | Age: 77
End: 2022-03-09
Payer: COMMERCIAL

## 2022-03-09 ENCOUNTER — HOSPITAL ENCOUNTER (OUTPATIENT)
Dept: CARDIOLOGY | Facility: CLINIC | Age: 77
Discharge: HOME | End: 2022-03-09
Attending: NURSE PRACTITIONER | Admitting: NURSE PRACTITIONER
Payer: COMMERCIAL

## 2022-03-09 DIAGNOSIS — R00.2 PALPITATIONS: ICD-10-CM

## 2022-03-09 PROCEDURE — 93242 EXT ECG>48HR<7D RECORDING: CPT

## 2022-03-09 PROCEDURE — 93244 EXT ECG>48HR<7D REV&INTERPJ: CPT | Performed by: INTERNAL MEDICINE

## 2022-03-09 NOTE — TELEPHONE ENCOUNTER
Patient notified of lab results and comments from June  Magnesium elevated at 2.5mg normal range 1.6-2.3 patient states she does take magnesium supplements and will discontinue taking them.  Patient verbalized understanding.

## 2022-03-09 NOTE — TELEPHONE ENCOUNTER
----- Message from MARYJO Vasquez CNP sent at 3/8/2022  5:24 PM CST -----  All labs reviewed  normal troponin  LDL at goal  BMP is WNL  TSH normal  Magnesium is high  ask about supplements  Pls let her know  thanks, OMAR

## 2022-03-15 ENCOUNTER — HOSPITAL ENCOUNTER (OUTPATIENT)
Dept: NUCLEAR MEDICINE | Facility: CLINIC | Age: 77
Setting detail: NUCLEAR MEDICINE
Discharge: HOME | End: 2022-03-15
Attending: NURSE PRACTITIONER | Admitting: NURSE PRACTITIONER
Payer: COMMERCIAL

## 2022-03-15 ENCOUNTER — HOSPITAL ENCOUNTER (OUTPATIENT)
Dept: NUCLEAR MEDICINE | Facility: CLINIC | Age: 77
Setting detail: NUCLEAR MEDICINE
Discharge: HOME | End: 2022-03-15
Attending: NURSE PRACTITIONER
Payer: COMMERCIAL

## 2022-03-15 ENCOUNTER — HOSPITAL ENCOUNTER (OUTPATIENT)
Dept: CARDIOLOGY | Facility: CLINIC | Age: 77
Discharge: HOME | End: 2022-03-15
Attending: NURSE PRACTITIONER | Admitting: NURSE PRACTITIONER
Payer: COMMERCIAL

## 2022-03-15 DIAGNOSIS — R07.89 OTHER CHEST PAIN: ICD-10-CM

## 2022-03-15 LAB
CV STRESS MAX HR HE: 123
NUC STRESS EJECTION FRACTION: 82 %
RATE PRESSURE PRODUCT: NORMAL
STRESS ECHO BASELINE DIASTOLIC HE: 74
STRESS ECHO BASELINE HR: 84 BPM
STRESS ECHO BASELINE SYSTOLIC BP: 148
STRESS ECHO CALCULATED PERCENT HR: 87 %
STRESS ECHO LAST STRESS DIASTOLIC BP: 77
STRESS ECHO LAST STRESS SYSTOLIC BP: 127
STRESS ECHO TARGET HR: 142
STRESS/REST PERFUSION RATIO: 1.18

## 2022-03-15 PROCEDURE — 93017 CV STRESS TEST TRACING ONLY: CPT

## 2022-03-15 PROCEDURE — 78452 HT MUSCLE IMAGE SPECT MULT: CPT

## 2022-03-15 PROCEDURE — 93018 CV STRESS TEST I&R ONLY: CPT | Performed by: INTERNAL MEDICINE

## 2022-03-15 PROCEDURE — 250N000011 HC RX IP 250 OP 636

## 2022-03-15 PROCEDURE — A9502 TC99M TETROFOSMIN: HCPCS | Performed by: NURSE PRACTITIONER

## 2022-03-15 PROCEDURE — 78452 HT MUSCLE IMAGE SPECT MULT: CPT | Mod: 26 | Performed by: INTERNAL MEDICINE

## 2022-03-15 PROCEDURE — 93016 CV STRESS TEST SUPVJ ONLY: CPT | Performed by: INTERNAL MEDICINE

## 2022-03-15 PROCEDURE — 343N000001 HC RX 343: Performed by: NURSE PRACTITIONER

## 2022-03-15 RX ORDER — REGADENOSON 0.08 MG/ML
INJECTION, SOLUTION INTRAVENOUS
Status: COMPLETED
Start: 2022-03-15 | End: 2022-03-15

## 2022-03-15 RX ADMIN — TETROFOSMIN 10.2 MCI.: 1.38 INJECTION, POWDER, LYOPHILIZED, FOR SOLUTION INTRAVENOUS at 11:05

## 2022-03-15 RX ADMIN — TETROFOSMIN 30 MCI.: 1.38 INJECTION, POWDER, LYOPHILIZED, FOR SOLUTION INTRAVENOUS at 12:27

## 2022-03-15 RX ADMIN — REGADENOSON 0.4 MG: 0.08 INJECTION, SOLUTION INTRAVENOUS at 12:20

## 2022-03-16 ENCOUNTER — TELEPHONE (OUTPATIENT)
Dept: CARDIOLOGY | Facility: CLINIC | Age: 77
End: 2022-03-16
Payer: COMMERCIAL

## 2022-03-16 NOTE — TELEPHONE ENCOUNTER
Patient returned call with stress echo results and reassured negative for stress induced ischemia/infarction follow up as planned Patient requesting a copy of her results be mailed to her home address. Mary Adams RN on 3/16/2022 at 2:20 PM

## 2022-03-23 ENCOUNTER — TELEPHONE (OUTPATIENT)
Dept: CARDIOLOGY | Facility: CLINIC | Age: 77
End: 2022-03-23
Payer: COMMERCIAL

## 2022-03-23 NOTE — TELEPHONE ENCOUNTER
Patient notified of results of 3 day Zio per June and will folllow up as planned.  Patient verbalized understanding.

## 2022-03-23 NOTE — TELEPHONE ENCOUNTER
----- Message from MARYJO Vasquez CNP sent at 3/22/2022 10:55 AM CDT -----  3-day Ziopatch results reviewed  This showed sinus rhythm with rare PACs, PVCs  No A-fib   follow up as planned  Pls let her know  thanks, OMAR

## 2022-04-05 NOTE — PROGRESS NOTES
"HISTORY OF PRESENT ILLNESS:  This is a 76 year old female who follows with Dr. Guevara at [facility]   Her past medical history includes:  Coronary artery disease, hypertension, and hyperlipidemia:     Ms Beckham suffered a NSTEMI (2013) and underwent RCA stenting.  She has remaining moderate proximal LAD and mid CFX disease.  ECHO showed LVEF 55-60% with inferolateral hypokinesis, normal RV function, and no significant valvular pathology.  A follow up stress NUC (2013) did not show any ischemia/infarction      Holter monitor (2015) showed sinus rhythm with a 2% PVC burden.     When seen in clinic last year, she complained of some atypical-type chest pains  A Karrie NUC stress test (1/2021) did not show any stress-induced ischemia/infarction  LVEF 70%     She was seen in the ED (11/2021) for nocturnal chest pain, palpitations, and shortness of breath. She ruled out for a MI.   Chest CT showed mosaic attenuation groundglass opacity possibly concerning for infectious process but refused admission.     Due to ongoing atypical chest pain, a Karrie NUC stress test was arranged (3/15/22)  This showed no evidence of stress-induced ischemia/infarction  LVEF 82%  Small LV cavity size    She also complained of palpitations and a 3-day ZioPatch monitor was ordered  This showed sinus rhythm with an average HR of 69 bpm  Rare PACs, PVCs.    Losartan was added for better BP control last month    Our visit today is for further review.    Ms Beckham denies any chest pain or significant shortness of breath  Her activity is limited due to knee issues and she uses a cane.  Since her monitor, she noted one episode of palpitations lasting up to 5 minutes  She states that SL NTG took them away  Overall, she thinks her palpitations have improved since Losartan was started. We talked about doing a longer event monitor, but she deferred   She occasionally \"hears\" a skipped beat in her ears.  She denies any associated lightheadedness or near " syncope  She has no significant peripheral edema          VITAL SIGNS:  BP: 144/68  Pulse:  56  Weight:  Declined being weighed     IMPRESSION AND PLAN:     Coronary Artery Disease:  -s/p NSTEMI and stenting to RCA (2013)  -known remaining moderate proximal LAD and mid CFX disease  -some atypical CP with a recent stress NUC showing no ischemia/infarct  LVEF 70%       Hypertension:  -on Metoprolol 25 mg BID, Losartan 25 mg  -BP elevated  -will increase Losartan to 25 mg BID  -return in 1 month with BMP and BP check with RN     Palpitations:  -recent ZioPatch monitor showed rare PVCs, PACs  No A-fib  -deferred longer event monitor at this time     Hyperlipidemia:  -on Atorvastatin 40 mg  -LDL 29    The total time for the visit today was 30 minutes which includes patient visit, reviewing of records, discussion, and placing of orders of the outpatient coordination of cardiovascular care as described.  The level of medical decision making during this visit was of moderate complexity.  Thank you for allowing me to participate in their care.      Orders Placed This Encounter   Procedures     Basic metabolic panel     Follow-Up with Cardiology Nurse       Orders Placed This Encounter   Medications     losartan (COZAAR) 25 MG tablet     Sig: Take 1 tablet (25 mg) by mouth 2 times daily     Dispense:  180 tablet     Refill:  3     atorvastatin (LIPITOR) 40 MG tablet     Sig: Take 1 tablet (40 mg) by mouth every evening     Dispense:  90 tablet     Refill:  3       Medications Discontinued During This Encounter   Medication Reason     Naproxen Sodium (ALEVE PO) Discontinued by another Health Care Provider     nitroGLYcerin (NITROSTAT) 0.4 MG sublingual tablet Medication Reconciliation Clean Up     losartan (COZAAR) 25 MG tablet      atorvastatin (LIPITOR) 40 MG tablet Reorder         Encounter Diagnoses   Name Primary?     Palpitations      Other chest pain      Benign essential hypertension      Coronary artery disease  involving native coronary artery of native heart without angina pectoris Yes       CURRENT MEDICATIONS:  Current Outpatient Medications   Medication Sig Dispense Refill     Ascorbic Acid (VITAMIN C PO) Take 500 mg by mouth daily        aspirin (ASA) 81 MG EC tablet Take 2 tablets (162 mg) by mouth At Bedtime 1 tablet 0     atorvastatin (LIPITOR) 40 MG tablet Take 1 tablet (40 mg) by mouth every evening 90 tablet 3     Biotin 1 MG CAPS Take by mouth daily       CALCIUM-VITAMIN D PO Take 1 tablet by mouth daily       cyanocobalamin 1000 MCG SUBL Place 1,000 mcg under the tongue daily       cycloSPORINE (RESTASIS) 0.05 % ophthalmic emulsion Place 1 drop into both eyes 2 times daily       losartan (COZAAR) 25 MG tablet Take 1 tablet (25 mg) by mouth 2 times daily 180 tablet 3     MAGNESIUM PO Take 1 tablet by mouth daily       melatonin 5 MG tablet Take 5 mg by mouth every evening       metoprolol (LOPRESSOR) 50 MG tablet Take 25 mg by mouth 2 times daily  60 tablet      nitroglycerin (NITROSTAT) 0.4 MG SL tablet Place 1 tablet (0.4 mg) under the tongue every 5 minutes as needed for chest pain 25 tablet 0     UNABLE TO FIND Vit D3 25 mg once a day       VITAMIN E PO Take 400 Units by mouth daily          ALLERGIES     Allergies   Allergen Reactions     Lisinopril Cough     adverse reaction       PAST MEDICAL HISTORY:  Past Medical History:   Diagnosis Date     Coronary artery disease     stent 2013     Coronary artery disease involving native coronary artery of native heart without angina pectoris 12/19/2016     Essential hypertension 12/19/2016     Myocardial infarction (H)     NSTEMI 2013     NSTEMI (non-ST elevated myocardial infarction) (H) 10/18/2013     Osteoarthritis of knee      Pure hypercholesterolemia      S/P right coronary artery (RCA) stent placement 12/19/2017       PAST SURGICAL HISTORY:  Past Surgical History:   Procedure Laterality Date     CATARACT EXTRACTION Right      CORONARY STENT PLACEMENT  2013      HC LEFT HEART CATHETERIZATION  10/18/13    Stent to RCA     lens implant surgery       ZZC TOTAL KNEE ARTHROPLASTY Right 3/2/2018    Procedure: RIGHT TOTAL KNEE ARTHROPLASTY;  Surgeon: Vinnie De Santiago MD;  Location: [facility] Main OR;  Service: Orthopedics       FAMILY HISTORY:  Family History   Problem Relation Age of Onset     Cerebrovascular Disease Mother 79        possible MI     Hypertension Mother      C.A.D. Father 59        MI     C.A.D. Brother         2 stents     C.A.D. Other         MI     Cancer Other 69        lung       SOCIAL HISTORY:  Social History     Socioeconomic History     Marital status:      Spouse name: None     Number of children: None     Years of education: None     Highest education level: None   Occupational History     None   Tobacco Use     Smoking status: Former Smoker     Quit date:      Years since quittin.2     Smokeless tobacco: Never Used   Substance and Sexual Activity     Alcohol use: Yes     Comment: 1-2 a day     Drug use: No     Sexual activity: None   Other Topics Concern     Parent/sibling w/ CABG, MI or angioplasty before 65F 55M? Not Asked      Service Not Asked     Blood Transfusions Not Asked     Caffeine Concern No     Comment: 1-2  mug mornings     Occupational Exposure Not Asked     Hobby Hazards Not Asked     Sleep Concern No     Comment: does not sleep well     Stress Concern Not Asked     Weight Concern Not Asked     Special Diet No     Back Care Not Asked     Exercise No     Comment: due to knees     Bike Helmet Not Asked     Seat Belt Yes     Self-Exams Not Asked   Social History Narrative     None     Social Determinants of Health     Financial Resource Strain: Not on file   Food Insecurity: Not on file   Transportation Needs: Not on file   Physical Activity: Not on file   Stress: Not on file   Social Connections: Not on file   Intimate Partner Violence: Not on file   Housing Stability: Not on file       Review of  Systems:  Skin:  Negative       Eyes:  Positive for glasses;glaucoma cataract extraction of right eye; 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; cataract in left eye  ENT:  Positive for tinnitus    Respiratory:  Negative       Cardiovascular:    Positive for;palpitations;chest pain;heaviness;lightheadedness    Gastroenterology: Negative      Genitourinary:  not assessed      Musculoskeletal:  Positive for joint pain shoulders, hands, both knees; Right TKR  Neurologic:  Positive for numbness or tingling of hands    Psychiatric:  not assessed      Heme/Lymph/Imm:  Positive for allergies RX  Endocrine:  Negative        Physical Exam:  Vitals: BP (!) 144/68   Pulse 56   Ht 1.524 m (5')   LMP  (LMP Unknown)   BMI 25.39 kg/m      Constitutional:  cooperative        Skin:  warm and dry to the touch          Head:  normocephalic        Eyes:  pupils equal and round        Lymph:      ENT:  no pallor or cyanosis        Neck:  JVP normal;no carotid bruit        Respiratory:  clear to auscultation;normal respiratory excursion    fewe rales in left base otherwise clear to auscultation    Cardiac: regular rhythm;normal S1 and S2;apical impulse not displaced   S4   systolic ejection murmur;grade 1;LLSB;radiation to the RUSB        pulses full and equal                                        GI:  abdomen soft        Extremities and Muscular Skeletal:  no edema arthritic deformities of LE            Neurological:  affect appropriate   walks with a cane    Psych:  Alert and Oriented x 3          CC  MARYJO Vasquez CNP  [address]  [place] [id number]

## 2022-04-06 ENCOUNTER — OFFICE VISIT (OUTPATIENT)
Dept: CARDIOLOGY | Facility: CLINIC | Age: 77
End: 2022-04-06
Attending: NURSE PRACTITIONER
Payer: COMMERCIAL

## 2022-04-06 VITALS
DIASTOLIC BLOOD PRESSURE: 68 MMHG | HEIGHT: 60 IN | BODY MASS INDEX: 25.39 KG/M2 | SYSTOLIC BLOOD PRESSURE: 144 MMHG | HEART RATE: 56 BPM

## 2022-04-06 DIAGNOSIS — R00.2 PALPITATIONS: ICD-10-CM

## 2022-04-06 DIAGNOSIS — I25.10 CORONARY ARTERY DISEASE INVOLVING NATIVE CORONARY ARTERY OF NATIVE HEART WITHOUT ANGINA PECTORIS: Primary | ICD-10-CM

## 2022-04-06 DIAGNOSIS — I10 BENIGN ESSENTIAL HYPERTENSION: ICD-10-CM

## 2022-04-06 DIAGNOSIS — R07.89 OTHER CHEST PAIN: ICD-10-CM

## 2022-04-06 PROCEDURE — 99214 OFFICE O/P EST MOD 30 MIN: CPT | Performed by: NURSE PRACTITIONER

## 2022-04-06 RX ORDER — LOSARTAN POTASSIUM 25 MG/1
25 TABLET ORAL 2 TIMES DAILY
Qty: 180 TABLET | Refills: 3 | Status: SHIPPED | OUTPATIENT
Start: 2022-04-06 | End: 2023-05-17

## 2022-04-06 RX ORDER — ATORVASTATIN CALCIUM 40 MG/1
40 TABLET, FILM COATED ORAL EVERY EVENING
Qty: 90 TABLET | Refills: 3 | Status: SHIPPED | OUTPATIENT
Start: 2022-04-06 | End: 2023-02-10

## 2022-04-06 NOTE — PATIENT INSTRUCTIONS
Increase your Losartan to 25 mg twice a day    Return in 1 month with labs and BP check with the RN

## 2022-04-06 NOTE — LETTER
4/6/2022    Minesh Conte MD  [facility] [address] [place] [address]    RE: Malinda Beckham       Dear Colleague,     I had the pleasure of seeing Malinda Beckham in the [facility].  HISTORY OF PRESENT ILLNESS:  This is a 76 year old female who follows with Dr. Guevara at [facility]   Her past medical history includes:  Coronary artery disease, hypertension, and hyperlipidemia:     Ms Beckham suffered a NSTEMI (2013) and underwent RCA stenting.  She has remaining moderate proximal LAD and mid CFX disease.  ECHO showed LVEF 55-60% with inferolateral hypokinesis, normal RV function, and no significant valvular pathology.  A follow up stress NUC (2013) did not show any ischemia/infarction      Holter monitor (2015) showed sinus rhythm with a 2% PVC burden.     When seen in clinic last year, she complained of some atypical-type chest pains  A Karrie NUC stress test (1/2021) did not show any stress-induced ischemia/infarction  LVEF 70%     She was seen in the ED (11/2021) for nocturnal chest pain, palpitations, and shortness of breath. She ruled out for a MI.   Chest CT showed mosaic attenuation groundglass opacity possibly concerning for infectious process but refused admission.     Due to ongoing atypical chest pain, a Karrie NUC stress test was arranged (3/15/22)  This showed no evidence of stress-induced ischemia/infarction  LVEF 82%  Small LV cavity size    She also complained of palpitations and a 3-day ZioPatch monitor was ordered  This showed sinus rhythm with an average HR of 69 bpm  Rare PACs, PVCs.    Losartan was added for better BP control last month    Our visit today is for further review.    Ms Beckham denies any chest pain or significant shortness of breath  Her activity is limited due to knee issues and she uses a cane.  Since her monitor, she noted one episode of palpitations lasting up to 5 minutes  She states that SL NTG took them away  Overall, she thinks  "her palpitations have improved since Losartan was started. We talked about doing a longer event monitor, but she deferred   She occasionally \"hears\" a skipped beat in her ears.  She denies any associated lightheadedness or near syncope  She has no significant peripheral edema          VITAL SIGNS:  BP: 144/68  Pulse:  56  Weight:  Declined being weighed     IMPRESSION AND PLAN:     Coronary Artery Disease:  -s/p NSTEMI and stenting to RCA (2013)  -known remaining moderate proximal LAD and mid CFX disease  -some atypical CP with a recent stress NUC showing no ischemia/infarct  LVEF 70%       Hypertension:  -on Metoprolol 25 mg BID, Losartan 25 mg  -BP elevated  -will increase Losartan to 25 mg BID  -return in 1 month with BMP and BP check with RN     Palpitations:  -recent ZioPatch monitor showed rare PVCs, PACs  No A-fib  -deferred longer event monitor at this time     Hyperlipidemia:  -on Atorvastatin 40 mg  -LDL 29    The total time for the visit today was 30 minutes which includes patient visit, reviewing of records, discussion, and placing of orders of the outpatient coordination of cardiovascular care as described.  The level of medical decision making during this visit was of moderate complexity.  Thank you for allowing me to participate in their care.      Orders Placed This Encounter   Procedures     Basic metabolic panel     Follow-Up with Cardiology Nurse       Orders Placed This Encounter   Medications     losartan (COZAAR) 25 MG tablet     Sig: Take 1 tablet (25 mg) by mouth 2 times daily     Dispense:  180 tablet     Refill:  3     atorvastatin (LIPITOR) 40 MG tablet     Sig: Take 1 tablet (40 mg) by mouth every evening     Dispense:  90 tablet     Refill:  3       Medications Discontinued During This Encounter   Medication Reason     Naproxen Sodium (ALEVE PO) Discontinued by another Health Care Provider     nitroGLYcerin (NITROSTAT) 0.4 MG sublingual tablet Medication Reconciliation Clean Up     " losartan (COZAAR) 25 MG tablet      atorvastatin (LIPITOR) 40 MG tablet Reorder         Encounter Diagnoses   Name Primary?     Palpitations      Other chest pain      Benign essential hypertension      Coronary artery disease involving native coronary artery of native heart without angina pectoris Yes       CURRENT MEDICATIONS:  Current Outpatient Medications   Medication Sig Dispense Refill     Ascorbic Acid (VITAMIN C PO) Take 500 mg by mouth daily        aspirin (ASA) 81 MG EC tablet Take 2 tablets (162 mg) by mouth At Bedtime 1 tablet 0     atorvastatin (LIPITOR) 40 MG tablet Take 1 tablet (40 mg) by mouth every evening 90 tablet 3     Biotin 1 MG CAPS Take by mouth daily       CALCIUM-VITAMIN D PO Take 1 tablet by mouth daily       cyanocobalamin 1000 MCG SUBL Place 1,000 mcg under the tongue daily       cycloSPORINE (RESTASIS) 0.05 % ophthalmic emulsion Place 1 drop into both eyes 2 times daily       losartan (COZAAR) 25 MG tablet Take 1 tablet (25 mg) by mouth 2 times daily 180 tablet 3     MAGNESIUM PO Take 1 tablet by mouth daily       melatonin 5 MG tablet Take 5 mg by mouth every evening       metoprolol (LOPRESSOR) 50 MG tablet Take 25 mg by mouth 2 times daily  60 tablet      nitroglycerin (NITROSTAT) 0.4 MG SL tablet Place 1 tablet (0.4 mg) under the tongue every 5 minutes as needed for chest pain 25 tablet 0     UNABLE TO FIND Vit D3 25 mg once a day       VITAMIN E PO Take 400 Units by mouth daily          ALLERGIES     Allergies   Allergen Reactions     Lisinopril Cough     adverse reaction       PAST MEDICAL HISTORY:  Past Medical History:   Diagnosis Date     Coronary artery disease     stent 2013     Coronary artery disease involving native coronary artery of native heart without angina pectoris 12/19/2016     Essential hypertension 12/19/2016     Myocardial infarction (H)     NSTEMI 2013     NSTEMI (non-ST elevated myocardial infarction) (H) 10/18/2013     Osteoarthritis of knee      Pure  hypercholesterolemia      S/P right coronary artery (RCA) stent placement 2017       PAST SURGICAL HISTORY:  Past Surgical History:   Procedure Laterality Date     CATARACT EXTRACTION Right      CORONARY STENT PLACEMENT       HC LEFT HEART CATHETERIZATION  10/18/13    Stent to RCA     lens implant surgery       ZZC TOTAL KNEE ARTHROPLASTY Right 3/2/2018    Procedure: RIGHT TOTAL KNEE ARTHROPLASTY;  Surgeon: Vinnie De Santiago MD;  Location: [facility] Main OR;  Service: Orthopedics       FAMILY HISTORY:  Family History   Problem Relation Age of Onset     Cerebrovascular Disease Mother 79        possible MI     Hypertension Mother      C.A.D. Father 59        MI     C.A.D. Brother         2 stents     C.A.D. Other         MI     Cancer Other 69        lung       SOCIAL HISTORY:  Social History     Socioeconomic History     Marital status:      Spouse name: None     Number of children: None     Years of education: None     Highest education level: None   Occupational History     None   Tobacco Use     Smoking status: Former Smoker     Quit date:      Years since quittin.2     Smokeless tobacco: Never Used   Substance and Sexual Activity     Alcohol use: Yes     Comment: 1-2 a day     Drug use: No     Sexual activity: None   Other Topics Concern     Parent/sibling w/ CABG, MI or angioplasty before 65F 55M? Not Asked      Service Not Asked     Blood Transfusions Not Asked     Caffeine Concern No     Comment: 1-2  mug mornings     Occupational Exposure Not Asked     Hobby Hazards Not Asked     Sleep Concern No     Comment: does not sleep well     Stress Concern Not Asked     Weight Concern Not Asked     Special Diet No     Back Care Not Asked     Exercise No     Comment: due to knees     Bike Helmet Not Asked     Seat Belt Yes     Self-Exams Not Asked   Social History Narrative     None     Social Determinants of Health     Financial Resource Strain: Not on file   Food Insecurity: Not  on file   Transportation Needs: Not on file   Physical Activity: Not on file   Stress: Not on file   Social Connections: Not on file   Intimate Partner Violence: Not on file   Housing Stability: Not on file       Review of Systems:  Skin:  Negative       Eyes:  Positive for glasses;glaucoma cataract extraction of right eye; 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; cataract in left eye  ENT:  Positive for tinnitus    Respiratory:  Negative       Cardiovascular:    Positive for;palpitations;chest pain;heaviness;lightheadedness    Gastroenterology: Negative      Genitourinary:  not assessed      Musculoskeletal:  Positive for joint pain shoulders, hands, both knees; Right TKR  Neurologic:  Positive for numbness or tingling of hands    Psychiatric:  not assessed      Heme/Lymph/Imm:  Positive for allergies RX  Endocrine:  Negative        Physical Exam:  Vitals: BP (!) 144/68   Pulse 56   Ht 1.524 m (5')   LMP  (LMP Unknown)   BMI 25.39 kg/m      Constitutional:  cooperative        Skin:  warm and dry to the touch          Head:  normocephalic        Eyes:  pupils equal and round        Lymph:      ENT:  no pallor or cyanosis        Neck:  JVP normal;no carotid bruit        Respiratory:  clear to auscultation;normal respiratory excursion    fewe rales in left base otherwise clear to auscultation    Cardiac: regular rhythm;normal S1 and S2;apical impulse not displaced   S4   systolic ejection murmur;grade 1;LLSB;radiation to the RUSB        pulses full and equal                                        GI:  abdomen soft        Extremities and Muscular Skeletal:  no edema arthritic deformities of LE            Neurological:  affect appropriate   walks with a cane    Psych:  Alert and Oriented x 3          CC  MARYJO Vasquez CNP  [address]  [place]  [place] [address]    Thank you for allowing me to participate in the care of your patient.      Sincerely,     MARYJO Echeverria CNP     [facility]  [facility]

## 2022-05-05 ENCOUNTER — ALLIED HEALTH/NURSE VISIT (OUTPATIENT)
Dept: CARDIOLOGY | Facility: CLINIC | Age: 77
End: 2022-05-05
Attending: NURSE PRACTITIONER
Payer: COMMERCIAL

## 2022-05-05 ENCOUNTER — DOCUMENTATION ONLY (OUTPATIENT)
Dept: CARDIOLOGY | Facility: CLINIC | Age: 77
End: 2022-05-05

## 2022-05-05 ENCOUNTER — LAB (OUTPATIENT)
Dept: LAB | Facility: CLINIC | Age: 77
End: 2022-05-05
Attending: NURSE PRACTITIONER

## 2022-05-05 VITALS — SYSTOLIC BLOOD PRESSURE: 118 MMHG | OXYGEN SATURATION: 97 % | DIASTOLIC BLOOD PRESSURE: 60 MMHG | HEART RATE: 50 BPM

## 2022-05-05 DIAGNOSIS — I10 BENIGN ESSENTIAL HYPERTENSION: ICD-10-CM

## 2022-05-05 LAB
ANION GAP SERPL CALCULATED.3IONS-SCNC: 5 MMOL/L (ref 3–14)
BUN SERPL-MCNC: 22 MG/DL (ref 7–30)
CALCIUM SERPL-MCNC: 9.3 MG/DL (ref 8.5–10.1)
CHLORIDE BLD-SCNC: 109 MMOL/L (ref 94–109)
CO2 SERPL-SCNC: 26 MMOL/L (ref 20–32)
CREAT SERPL-MCNC: 0.67 MG/DL (ref 0.52–1.04)
GFR SERPL CREATININE-BSD FRML MDRD: 89 ML/MIN/1.73M2
GLUCOSE BLD-MCNC: 109 MG/DL (ref 70–99)
POTASSIUM BLD-SCNC: 4.3 MMOL/L (ref 3.4–5.3)
SODIUM SERPL-SCNC: 140 MMOL/L (ref 133–144)

## 2022-05-05 PROCEDURE — 99207 PR NO CHARGE LOS: CPT

## 2022-05-05 PROCEDURE — 36415 COLL VENOUS BLD VENIPUNCTURE: CPT | Performed by: NURSE PRACTITIONER

## 2022-05-05 PROCEDURE — 80048 BASIC METABOLIC PNL TOTAL CA: CPT | Performed by: NURSE PRACTITIONER

## 2022-05-05 NOTE — PROGRESS NOTES
[facility] BLOOD PRESSURE CHECK     Last office visit: 4/6/22    Previous blood pressure: 144/68 mm Hg  Previous heart rate: 56 bpm      Time of visit: 1015 am    Morning medications were taken at: 900 am     Today's blood pressure: 118/60 mm Hg  Today's heart rate:  50 bpm     Home monitor blood pressure: n/a mmHg  Home monitor heart rate: n/a bpm      Additional Comments:   Has not taken home BP in the last 3 weeks.   Needs refill of metoprolol, she has about 2 weeks left.    Results routed to: Ana Bruce      Ordering Provider: Lauren Esqueda  In clinic Provider: Dr Carrington

## 2022-05-05 NOTE — PROGRESS NOTES
Last visit 4/6/22 with CHRISSY Nina for CAD, HTN, palpitations, and HLD. BP elevated at 144/68 in clinic, Losartan increased to 25mg BID. Orders for BP check in 1 month with BMP. BP improved and BMP stable. No follow up orders. Routing to CHRISSY Nina to review follow up plan.     Ana Levine RN, BSN  05/05/22 at 12:27 PM

## 2022-05-12 NOTE — PROGRESS NOTES
Called pt, no answer. LVM letting her know BP improved and BMP WNL per CHRISSY Nina and to continue current medications. Left call back number for questions.     Ana Levine RN, BSN  05/12/22 at 10:48 AM

## 2022-05-12 NOTE — PROGRESS NOTES
BP controlled now on higher dose of Losartan  BMP with WNL  Continue current medical regimen  Thanks, JS

## 2022-05-16 ENCOUNTER — TELEPHONE (OUTPATIENT)
Dept: CARDIOLOGY | Facility: CLINIC | Age: 77
End: 2022-05-16
Payer: COMMERCIAL

## 2022-05-16 DIAGNOSIS — I10 BENIGN ESSENTIAL HYPERTENSION: Primary | ICD-10-CM

## 2022-05-16 RX ORDER — METOPROLOL TARTRATE 50 MG
25 TABLET ORAL 2 TIMES DAILY
Qty: 60 TABLET | Refills: 3 | Status: SHIPPED | OUTPATIENT
Start: 2022-05-16 | End: 2023-01-19

## 2022-05-16 NOTE — TELEPHONE ENCOUNTER
[facility] Call [facility]    Phone Message    May a detailed message be left on voicemail: yes     Reason for Call: Medication Refill Request    Has the patient contacted the pharmacy for the refill? No, patient states Dr Serrano formally prescribed and Lauren Esqueda agreed to manage   Name of medication being requested: metoprolol  Provider who prescribed the medication: unknown  Pharmacy: [facility] [address] [place] [address]  Date medication is needed: 05/18/2022         Action Taken: Other: routed to      Travel Screening: Not Applicable

## 2022-11-10 NOTE — ED TRIAGE NOTES
Pt states chest pain and palpitations tonight. Pt states symptoms improved temporarily with nitroglycerin. Pt states symptoms similar to previous MI. ABCs intact GCS 15    Restorative Technician Note      Patient Name: Judie Pierson     Restorative Tech Visit Date: 11/10/2022  Note Type: Mobility  Patient Position Upon Consult: Supine  Activity Performed: Transferred  Assistive Device: Other (Comment) [facility] x2)  Patient Position at End of Consult: Bedside chair;  All needs within reach; Bed/Chair alarm activated    Светлана Mccoy  DPT, Restorative Technician

## 2023-01-19 DIAGNOSIS — I10 BENIGN ESSENTIAL HYPERTENSION: ICD-10-CM

## 2023-01-19 RX ORDER — METOPROLOL TARTRATE 50 MG
25 TABLET ORAL 2 TIMES DAILY
Qty: 90 TABLET | Refills: 3 | Status: SHIPPED | OUTPATIENT
Start: 2023-01-19 | End: 2024-02-21

## 2023-01-19 NOTE — TELEPHONE ENCOUNTER
Received refill request from [facility] pharmacy for refill of Metoprolol Tartrate 50mg, take 1\2 tablet twice a day.    Last OV   5\5\22  Last labwork     5\5\22    [facility] Cardiology Refill Guideline reviewed.  Medication meets criteria for refill.     Yumiko Patino RN on 1/19/2023 at 1:34 PM

## 2023-01-20 ENCOUNTER — TRANSFERRED RECORDS (OUTPATIENT)
Dept: HEALTH INFORMATION MANAGEMENT | Facility: CLINIC | Age: 78
End: 2023-01-20

## 2023-01-20 ENCOUNTER — LAB REQUISITION (OUTPATIENT)
Dept: LAB | Facility: CLINIC | Age: 78
End: 2023-01-20
Payer: COMMERCIAL

## 2023-01-20 DIAGNOSIS — M25.462 EFFUSION, LEFT KNEE: ICD-10-CM

## 2023-01-20 LAB
APPEARANCE FLD: ABNORMAL
CELL COUNT BODY FLUID SOURCE: ABNORMAL
COLOR FLD: ABNORMAL
CRYSTALS SNV MICRO: NORMAL
GRAM STAIN RESULT: NORMAL
GRAM STAIN RESULT: NORMAL
LYMPHOCYTES NFR FLD MANUAL: 2 %
MONOS+MACROS NFR FLD MANUAL: 9 %
NEUTS BAND NFR FLD MANUAL: 89 %
WBC # FLD AUTO: ABNORMAL /UL

## 2023-01-20 PROCEDURE — 87070 CULTURE OTHR SPECIMN AEROBIC: CPT | Mod: ORL | Performed by: ORTHOPAEDIC SURGERY

## 2023-01-20 PROCEDURE — 89060 EXAM SYNOVIAL FLUID CRYSTALS: CPT | Mod: ORL | Performed by: ORTHOPAEDIC SURGERY

## 2023-01-20 PROCEDURE — 87075 CULTR BACTERIA EXCEPT BLOOD: CPT | Mod: ORL | Performed by: ORTHOPAEDIC SURGERY

## 2023-01-20 PROCEDURE — 89051 BODY FLUID CELL COUNT: CPT | Mod: ORL | Performed by: ORTHOPAEDIC SURGERY

## 2023-01-20 PROCEDURE — 87205 SMEAR GRAM STAIN: CPT | Mod: ORL | Performed by: ORTHOPAEDIC SURGERY

## 2023-01-20 PROCEDURE — 87102 FUNGUS ISOLATION CULTURE: CPT | Mod: ORL | Performed by: ORTHOPAEDIC SURGERY

## 2023-01-25 LAB — BACTERIA SNV CULT: NO GROWTH

## 2023-01-31 ENCOUNTER — TELEPHONE (OUTPATIENT)
Dept: CARDIOLOGY | Facility: CLINIC | Age: 78
End: 2023-01-31
Payer: COMMERCIAL

## 2023-01-31 NOTE — TELEPHONE ENCOUNTER
Incoming records from [facility] note 1-20-23 received.     Notes available for Dr. Doll's upcoming OV 2-9-23 sent to  and copy was sent to scan.

## 2023-02-03 LAB — BACTERIA SNV CULT: NORMAL

## 2023-02-06 NOTE — PROGRESS NOTES
Discharge plan according to [facility]:       01/30/23 1042   Discharge Planning   Patient/Family Anticipates Transition to home   Concerns to be Addressed all concerns addressed in this encounter   Living Arrangements   People in Home sibling(s);spouse   Type of Residence Private Residence   Is your private residence a single family home or apartment? Single family home   Stair Railings, Within Home, Primary railings safe and in good condition   Once home, are you able to live on one level? Yes   Which level? Main Level   Bathroom Shower/Tub Walk-in shower   Equipment Currently Used at Home none   Support System   Support Systems Spouse/Significant Other;Family Members   Do you have someone available to stay with you one or two nights once you are home? Yes

## 2023-02-09 ENCOUNTER — OFFICE VISIT (OUTPATIENT)
Dept: CARDIOLOGY | Facility: CLINIC | Age: 78
End: 2023-02-09
Attending: INTERNAL MEDICINE
Payer: COMMERCIAL

## 2023-02-09 VITALS
BODY MASS INDEX: 24.54 KG/M2 | WEIGHT: 125 LBS | OXYGEN SATURATION: 100 % | DIASTOLIC BLOOD PRESSURE: 50 MMHG | SYSTOLIC BLOOD PRESSURE: 110 MMHG | HEART RATE: 55 BPM | HEIGHT: 60 IN

## 2023-02-09 DIAGNOSIS — I25.10 CORONARY ARTERY DISEASE INVOLVING NATIVE CORONARY ARTERY OF NATIVE HEART WITHOUT ANGINA PECTORIS: Primary | ICD-10-CM

## 2023-02-09 PROCEDURE — 99214 OFFICE O/P EST MOD 30 MIN: CPT | Performed by: INTERNAL MEDICINE

## 2023-02-09 NOTE — PROGRESS NOTES
Clinic visit note dictated. Dictation reference number - [id number]      PHYSICAL EXAMINATION:  Vitals: /50 (BP Location: Right arm, Patient Position: Sitting, Cuff Size: Adult Regular)   Pulse 55   Ht 1.524 m (5')   Wt 56.7 kg (125 lb)   LMP  (LMP Unknown)   SpO2 100%   BMI 24.41 kg/m            CURRENT MEDICATIONS:  Current Outpatient Medications   Medication Sig Dispense Refill     Ascorbic Acid (VITAMIN C PO) Take 500 mg by mouth daily        aspirin (ASA) 81 MG EC tablet Take 162 mg by mouth 2 times daily 1 tablet 0     atorvastatin (LIPITOR) 40 MG tablet Take 1 tablet (40 mg) by mouth every evening 90 tablet 3     Biotin 1 MG CAPS Take by mouth 2 times daily       CALCIUM-VITAMIN D PO Take 1 tablet by mouth daily       cyanocobalamin 1000 MCG SUBL Place 1,000 mcg under the tongue daily       cycloSPORINE (RESTASIS) 0.05 % ophthalmic emulsion Place 1 drop into both eyes 2 times daily       losartan (COZAAR) 25 MG tablet Take 1 tablet (25 mg) by mouth 2 times daily 180 tablet 3     MAGNESIUM PO Take 1 tablet by mouth daily       melatonin 5 MG tablet Take 5 mg by mouth every evening       metoprolol tartrate (LOPRESSOR) 50 MG tablet Take 0.5 tablets (25 mg) by mouth 2 times daily 90 tablet 3     nitroglycerin (NITROSTAT) 0.4 MG SL tablet Place 1 tablet (0.4 mg) under the tongue every 5 minutes as needed for chest pain 25 tablet 0     UNABLE TO FIND Vit D3 25 mg once a day       VITAMIN E PO Take 400 Units by mouth daily            ALLERGIES:  Allergies   Allergen Reactions     Lisinopril Cough     adverse reaction       PAST MEDICAL HISTORY:    Past Medical History:   Diagnosis Date     Coronary artery disease     stent 2013     Coronary artery disease involving native coronary artery of native heart without angina pectoris 12/19/2016     Essential hypertension 12/19/2016     Myocardial infarction (H)     NSTEMI 2013     NSTEMI (non-ST elevated myocardial infarction) (H) 10/18/2013     Osteoarthritis  of knee      Pure hypercholesterolemia      S/P right coronary artery (RCA) stent placement 2017     Stented coronary artery        PAST SURGICAL HISTORY:    Past Surgical History:   Procedure Laterality Date     CATARACT EXTRACTION Right      CORONARY STENT PLACEMENT       HC LEFT HEART CATHETERIZATION  10/18/13    Stent to RCA     lens implant surgery       ZZC TOTAL KNEE ARTHROPLASTY Right 3/2/2018    Procedure: RIGHT TOTAL KNEE ARTHROPLASTY;  Surgeon: Vinnie De Santiago MD;  Location: [facility] Main OR;  Service: Orthopedics       FAMILY HISTORY:    Family History   Problem Relation Age of Onset     Cerebrovascular Disease Mother 79        possible MI     Hypertension Mother      C.A.D. Father 59        MI     C.A.D. Brother         2 stents     C.A.D. Other         MI     Cancer Other 69        lung       SOCIAL HISTORY:    Social History     Socioeconomic History     Marital status:      Spouse name: None     Number of children: None     Years of education: None     Highest education level: None   Tobacco Use     Smoking status: Former     Types: Cigarettes     Quit date:      Years since quittin.1     Smokeless tobacco: Never   Substance and Sexual Activity     Alcohol use: Yes     Comment: 1-2 a day     Drug use: No   Other Topics Concern     Caffeine Concern No     Comment: 1-2  mug mornings     Sleep Concern No     Comment: does not sleep well     Special Diet No     Exercise No     Comment: due to knees     Seat Belt Yes

## 2023-02-09 NOTE — LETTER
2/9/2023    Gertrude Serrano MD, MD  [facility] [address]  [facility] [address]    RE: Malinda Beckham       Dear Colleague,     I had the pleasure of seeing Malinda Beckham in the [facility].    Clinic visit note dictated. Dictation reference number - [id number]      PHYSICAL EXAMINATION:  Vitals: /50 (BP Location: Right arm, Patient Position: Sitting, Cuff Size: Adult Regular)   Pulse 55   Ht 1.524 m (5')   Wt 56.7 kg (125 lb)   LMP  (LMP Unknown)   SpO2 100%   BMI 24.41 kg/m            CURRENT MEDICATIONS:  Current Outpatient Medications   Medication Sig Dispense Refill     Ascorbic Acid (VITAMIN C PO) Take 500 mg by mouth daily        aspirin (ASA) 81 MG EC tablet Take 162 mg by mouth 2 times daily 1 tablet 0     atorvastatin (LIPITOR) 40 MG tablet Take 1 tablet (40 mg) by mouth every evening 90 tablet 3     Biotin 1 MG CAPS Take by mouth 2 times daily       CALCIUM-VITAMIN D PO Take 1 tablet by mouth daily       cyanocobalamin 1000 MCG SUBL Place 1,000 mcg under the tongue daily       cycloSPORINE (RESTASIS) 0.05 % ophthalmic emulsion Place 1 drop into both eyes 2 times daily       losartan (COZAAR) 25 MG tablet Take 1 tablet (25 mg) by mouth 2 times daily 180 tablet 3     MAGNESIUM PO Take 1 tablet by mouth daily       melatonin 5 MG tablet Take 5 mg by mouth every evening       metoprolol tartrate (LOPRESSOR) 50 MG tablet Take 0.5 tablets (25 mg) by mouth 2 times daily 90 tablet 3     nitroglycerin (NITROSTAT) 0.4 MG SL tablet Place 1 tablet (0.4 mg) under the tongue every 5 minutes as needed for chest pain 25 tablet 0     UNABLE TO FIND Vit D3 25 mg once a day       VITAMIN E PO Take 400 Units by mouth daily            ALLERGIES:  Allergies   Allergen Reactions     Lisinopril Cough     adverse reaction       PAST MEDICAL HISTORY:    Past Medical History:   Diagnosis Date     Coronary artery disease     stent 2013     Coronary artery disease involving  native coronary artery of native heart without angina pectoris 2016     Essential hypertension 2016     Myocardial infarction (H)     NSTEMI      NSTEMI (non-ST elevated myocardial infarction) (H) 10/18/2013     Osteoarthritis of knee      Pure hypercholesterolemia      S/P right coronary artery (RCA) stent placement 2017     Stented coronary artery        PAST SURGICAL HISTORY:    Past Surgical History:   Procedure Laterality Date     CATARACT EXTRACTION Right      CORONARY STENT PLACEMENT       HC LEFT HEART CATHETERIZATION  10/18/13    Stent to RCA     lens implant surgery       ZZC TOTAL KNEE ARTHROPLASTY Right 3/2/2018    Procedure: RIGHT TOTAL KNEE ARTHROPLASTY;  Surgeon: Vinnie De Santiago MD;  Location: [facility] OR;  Service: Orthopedics       FAMILY HISTORY:    Family History   Problem Relation Age of Onset     Cerebrovascular Disease Mother 79        possible MI     Hypertension Mother      C.A.D. Father 59        MI     C.A.D. Brother         2 stents     C.A.D. Other         MI     Cancer Other 69        lung       SOCIAL HISTORY:    Social History     Socioeconomic History     Marital status:      Spouse name: None     Number of children: None     Years of education: None     Highest education level: None   Tobacco Use     Smoking status: Former     Types: Cigarettes     Quit date: 1967     Years since quittin.1     Smokeless tobacco: Never   Substance and Sexual Activity     Alcohol use: Yes     Comment: 1-2 a day     Drug use: No   Other Topics Concern     Caffeine Concern No     Comment: 1-2  mug mornings     Sleep Concern No     Comment: does not sleep well     Special Diet No     Exercise No     Comment: due to knees     Seat Belt Yes                         Service Date: 2023    PRIMARY CARDIOLOGY TEAM:  Jasson Guevara MD; MARYJO Bruce, CNP     REASON FOR VISIT:  Preoperative cardiac clearance.    HISTORY OF PRESENT ILLNESS:    Malinda Beckham is a  77-year-old  lady who follows with Dr. Guevara and Lauren Esqueda.  I am seeing her as an urgent appointment for preoperative cardiac clearance.    She was in a wheelchair today due to knee discomfort.  She is scheduled to get a left knee arthroplasty at [facility] next week on 02/14/2023.    Her cardiovascular history is significant for coronary artery disease with remote NSTEMI in 2013, which was treated with a right coronary artery stenting with residual moderate disease in the proximal LAD and mid circumflex; preserved LVEF on echocardiogram at 55%-60% with inferolateral hypokinesis, normal right ventricular systolic function, no significant valve disease.  She does not have a history of significant arrhythmia such as atrial fibrillation.     She has no symptoms of chest pain or dyspnea.  She is on aspirin 81 mg, no anticoagulants.  Takes metoprolol tartrate 12.5 mg 2 times daily.  She is on treatment for hypertension, which is 110/50 today and a pulse of 55 BPM.      I personally reviewed and independently interpreted her Holter monitor, which shows regular sinus rhythm with no arrhythmias.  ECG shows sinus bradycardia at 56 bpm (on metoprolol) with normal cardiac intervals.     I independently interpreted her last nuclear stress test, which was negative for ischemia.  Echocardiogram as documented above.  Labs show normal CBC, normal TSH, normal renal panel with a creatinine of 0.67, but these were almost a year ago.  Lipid panel is excellent.    PHYSICAL EXAMINATION:    GENERAL:  Wheelchair bound.  CARDIOVASCULAR:  Regular heart sounds.  No murmur.  Normal JVP.  LUNGS:  Clear to auscultation.    DIAGNOSIS:    1. Preoperative cardiac clearance for right knee arthroplasty on 02/14/2023.      The patient has a negative stress test, preserved ventricular function, no significant valve disease.  No concerning symptoms.  She has successfully cleared.    PLAN:    1.  From a cardiac perspective, she is cleared  for her upcoming knee surgery on 2023.  2.  However, the patient has not had any recent labs and I advised her to follow up with her primary provider to complete a full preoperative clearance.  3.  My note will be faxed to the number she gave me at [facility] - [phone].   4.  Future follow up with her primary team, Dr. Guevara and Lauren Esqueda in a year.    Established patient.  Moderate complexity medical decision making.    Walter Doll MD    D: 2023   T: 2023   MT: mino    Name:     DYLAN RODRÍGUEZ  MRN:      [id number]        Account:      [id number]   :      1945           Service Date: 2023       Document: [id number]      Thank you for allowing me to participate in the care of your patient.      Sincerely,     Walter Doll MD     [facility]  cc:   Referred Self,

## 2023-02-09 NOTE — PROGRESS NOTES
Service Date: 02/09/2023    PRIMARY CARDIOLOGY TEAM:  Jasson Guevara MD; MARYJO Bruce, CNP     REASON FOR VISIT:  Preoperative cardiac clearance.    HISTORY OF PRESENT ILLNESS:    Malinda Beckham is a 77-year-old  lady who follows with Dr. Guevara and Lauren Esqueda.  I am seeing her as an urgent appointment for preoperative cardiac clearance.    She was in a wheelchair today due to knee discomfort.  She is scheduled to get a left knee arthroplasty at [facility] next week on 02/14/2023.    Her cardiovascular history is significant for coronary artery disease with remote NSTEMI in 2013, which was treated with a right coronary artery stenting with residual moderate disease in the proximal LAD and mid circumflex; preserved LVEF on echocardiogram at 55%-60% with inferolateral hypokinesis, normal right ventricular systolic function, no significant valve disease.  She does not have a history of significant arrhythmia such as atrial fibrillation.     She has no symptoms of chest pain or dyspnea.  She is on aspirin 81 mg, no anticoagulants.  Takes metoprolol tartrate 12.5 mg 2 times daily.  She is on treatment for hypertension, which is 110/50 today and a pulse of 55 BPM.      I personally reviewed and independently interpreted her Holter monitor, which shows regular sinus rhythm with no arrhythmias.  ECG shows sinus bradycardia at 56 bpm (on metoprolol) with normal cardiac intervals.     I independently interpreted her last nuclear stress test, which was negative for ischemia.  Echocardiogram as documented above.  Labs show normal CBC, normal TSH, normal renal panel with a creatinine of 0.67, but these were almost a year ago.  Lipid panel is excellent.    PHYSICAL EXAMINATION:    GENERAL:  Wheelchair bound.  CARDIOVASCULAR:  Regular heart sounds.  No murmur.  Normal JVP.  LUNGS:  Clear to auscultation.    DIAGNOSIS:    Preoperative cardiac clearance for right knee arthroplasty on 02/14/2023.      The patient has a  negative stress test, preserved ventricular function, no significant valve disease.  No concerning symptoms.  She has successfully cleared.    PLAN:    1.  From a cardiac perspective, she is cleared for her upcoming knee surgery on 2023.  2.  However, the patient has not had any recent labs and I advised her to follow up with her primary provider to complete a full preoperative clearance.  3.  My note will be faxed to the number she gave me at [facility] - [phone].   4.  Future follow up with her primary team, Dr. Guevara and Lauren Esqueda in a year.    Established patient.  Moderate complexity medical decision making.    Walter Doll MD        D: 2023   T: 2023   MT: mino    Name:     ANNEDYLANTheo  MRN:      [id number]        Account:      [id number]   :      1945           Service Date: 2023       Document: [id number]

## 2023-02-10 ENCOUNTER — TELEPHONE (OUTPATIENT)
Dept: CARDIOLOGY | Facility: CLINIC | Age: 78
End: 2023-02-10
Payer: COMMERCIAL

## 2023-02-10 NOTE — TELEPHONE ENCOUNTER
OV note 2-9-23 by Dr. Doll printed and faxed to [facility].     Dr. Doll's message     Completed preop cardiac evaluation today.  Please fax my note to [facility] at [phone].     Thank you.   Dr. Doll

## 2023-02-13 NOTE — TREATMENT PLAN
"Orthopedic Surgery Pre-Op Plan: Malinda Beckham  pre-op review. This is NOT an H&P   Surgeon: Dr. Green   [facility]: [facility]  Name of Surgery: Left Total Knee Arthroplasty   Date of Surgery: 2/14/23  H&P: Completed on 2/13/23 by Dr. Missy Mccartney at [facility].   History of ASA, NSAIDS, vitamin and/or herbal supplements within 10 days: Yes- on ASA 81 mg daily for CAD-S/P stent in 2013. Patient instructed to continue on ASA 81 mg perioperatively. Vitamin E-patient instructed to hold this for 7 days before surgery.  History of blood thinners: No    Plan:   1) Discharge Plan: Home morning of POD 1 with assist of Spouse and Sibling. Please see Discharge Planning section near bottom of this note for further details.     2) Coronary Artery Disease: History of NSTEMI in 2013 and S/P stenting to RCA: Had recent preoperative cardiac clearance appointment with Dr. Doll at [facility] on 2/9/23: Patient denies any chest pain or dyspnea. Had negative NM stress test 3/8/22, has preserved ventricular function, no significant valve disease. Per Dr. Doll, \" From a cardiac perspective, she is cleared for her upcoming knee surgery on 2/14/2023.\"     Continues on medical management of CAD with ASA 81 mg daily, statin and nitroglycerin as needed. PCP advised patient to remain on ASA 81 mg perioperatively.     3) Hypercholesterolemia: On atorvastatin.    4) Hypertension: Well-controlled on losartan and metoprolol.  Patient instructed to hold losartan on the morning of surgery but to continue taking metoprolol.     5) Leukocytosis: Mild: WBC 12.0 on 2/13/2023.  PCP notes that this is an incidental finding, patient feels well with no current illness or signs of infection.  Cleared by PCP for surgery.    Patient appears medically optimized for upcoming surgery. I would recommend Hospitalist Consult to assist with medical management. Please call me below with any questions on this patient.       Review of " Systems Notable for: Coronary artery disease-history of NSTEMI in 2013 and s/p stenting to RCA in 2013-cleared by cardiology for surgery at recent visit 2/9/2023, hypercholesterolemia, hypertension, leukocytosis-mild.     Past Medical History:   Past Medical History:   Diagnosis Date     Antiplatelet or antithrombotic long-term use      Coronary artery disease     stent 2013     Coronary artery disease involving native coronary artery of native heart without angina pectoris 12/19/2016     Essential hypertension 12/19/2016     Myocardial infarction (H)     NSTEMI 2013     NSTEMI (non-ST elevated myocardial infarction) (H) 10/18/2013     Osteoarthritis of knee      Pure hypercholesterolemia      S/P right coronary artery (RCA) stent placement 12/19/2017     Stented coronary artery      Past Surgical History:   Procedure Laterality Date     CATARACT EXTRACTION Right      CORONARY STENT PLACEMENT  2013     HC LEFT HEART CATHETERIZATION  10/18/13    Stent to RCA     lens implant surgery       ZZC TOTAL KNEE ARTHROPLASTY Right 3/2/2018    Procedure: RIGHT TOTAL KNEE ARTHROPLASTY;  Surgeon: Vinnie De Santiago MD;  Location: [facility];  Service: Orthopedics       Current Medications:  Patient's Medications   New Prescriptions    No medications on file   Previous Medications    ASCORBIC ACID (VITAMIN C PO)    Take 500 mg by mouth daily     ASPIRIN (ASA) 81 MG EC TABLET    Take 162 mg by mouth 2 times daily    BIOTIN 1 MG CAPS    Take by mouth 2 times daily    CALCIUM-VITAMIN D PO    Take 1 tablet by mouth daily    CYANOCOBALAMIN 1000 MCG SUBL    Place 1,000 mcg under the tongue daily    CYCLOSPORINE (RESTASIS) 0.05 % OPHTHALMIC EMULSION    Place 1 drop into both eyes 2 times daily    LOSARTAN (COZAAR) 25 MG TABLET    Take 1 tablet (25 mg) by mouth 2 times daily    MAGNESIUM PO    Take 1 tablet by mouth daily    MELATONIN 5 MG TABLET    Take 5 mg by mouth every evening    METOPROLOL TARTRATE (LOPRESSOR) 50 MG TABLET     Take 0.5 tablets (25 mg) by mouth 2 times daily    NITROGLYCERIN (NITROSTAT) 0.4 MG SL TABLET    Place 1 tablet (0.4 mg) under the tongue every 5 minutes as needed for chest pain    UNABLE TO FIND    Vit D3 25 mg once a day    VITAMIN E PO    Take 400 Units by mouth daily    Modified Medications    No medications on file   Discontinued Medications    ATORVASTATIN (LIPITOR) 40 MG TABLET    Take 1 tablet (40 mg) by mouth every evening       ALLERGIES:  Allergies   Allergen Reactions     Lisinopril Cough     adverse reaction       Social History  Social History     Tobacco Use     Smoking status: Former     Types: Cigarettes     Quit date:      Years since quittin.1     Smokeless tobacco: Never   Substance Use Topics     Alcohol use: Yes     Comment: 1-2 a day     Drug use: No       Any Abnormal Recent Diagnostics? Yes  WBC 12.0 on 2023: Incidental finding per notes from PCP.  Patient reports feeling well with no signs of current illness.  Cleared by PCP for surgery.    Discharge Planning:   Discharge plan according to [facility]:      Home morning of POD 1 with assist of Spouse and Sibling     23 1042   Discharge Planning   Patient/Family Anticipates Transition to home   Concerns to be Addressed all concerns addressed in this encounter   Living Arrangements   People in Home sibling(s);spouse   Type of Residence Private Residence   Is your private residence a single family home or apartment? Single family home   Stair Railings, Within Home, Primary railings safe and in good condition   Once home, are you able to live on one level? Yes   Which level? Main Level   Bathroom Shower/Tub Walk-in shower   Equipment Currently Used at Home none   Support System   Support Systems Spouse/Significant Other;Family Members   Do you have someone available to stay with you one or two nights once you are home? Yes       MARYJO Herbert, CNP   Advanced Practice Nurse Navigator- Orthopedics  [facility]  [facility]   Phone: [phone]

## 2023-02-14 ENCOUNTER — HOSPITAL ENCOUNTER (OUTPATIENT)
Facility: CLINIC | Age: 78
Discharge: HOME | End: 2023-02-15
Attending: ORTHOPAEDIC SURGERY | Admitting: ORTHOPAEDIC SURGERY
Payer: COMMERCIAL

## 2023-02-14 ENCOUNTER — APPOINTMENT (OUTPATIENT)
Dept: PHYSICAL THERAPY | Facility: CLINIC | Age: 78
End: 2023-02-14
Attending: ORTHOPAEDIC SURGERY
Payer: COMMERCIAL

## 2023-02-14 ENCOUNTER — ANESTHESIA EVENT (OUTPATIENT)
Dept: SURGERY | Facility: CLINIC | Age: 78
End: 2023-02-14
Payer: COMMERCIAL

## 2023-02-14 ENCOUNTER — ANESTHESIA (OUTPATIENT)
Dept: SURGERY | Facility: CLINIC | Age: 78
End: 2023-02-14
Payer: COMMERCIAL

## 2023-02-14 ENCOUNTER — APPOINTMENT (OUTPATIENT)
Dept: RADIOLOGY | Facility: CLINIC | Age: 78
End: 2023-02-14
Attending: PHYSICIAN ASSISTANT
Payer: COMMERCIAL

## 2023-02-14 ENCOUNTER — ANCILLARY PROCEDURE (OUTPATIENT)
Dept: ULTRASOUND IMAGING | Facility: CLINIC | Age: 78
End: 2023-02-14
Attending: ANESTHESIOLOGY
Payer: COMMERCIAL

## 2023-02-14 DIAGNOSIS — Z96.652 STATUS POST TOTAL LEFT KNEE REPLACEMENT: Primary | ICD-10-CM

## 2023-02-14 LAB
ERYTHROCYTE [DISTWIDTH] IN BLOOD BY AUTOMATED COUNT: 12.3 % (ref 10–15)
HCT VFR BLD AUTO: 37.4 % (ref 35–47)
HGB BLD-MCNC: 12.4 G/DL (ref 11.7–15.7)
INR PPP: 0.99 (ref 0.85–1.15)
MCH RBC QN AUTO: 30.8 PG (ref 26.5–33)
MCHC RBC AUTO-ENTMCNC: 33.2 G/DL (ref 31.5–36.5)
MCV RBC AUTO: 93 FL (ref 78–100)
PLATELET # BLD AUTO: 252 10E3/UL (ref 150–450)
RBC # BLD AUTO: 4.03 10E6/UL (ref 3.8–5.2)
WBC # BLD AUTO: 12.3 10E3/UL (ref 4–11)

## 2023-02-14 PROCEDURE — 250N000011 HC RX IP 250 OP 636: Performed by: PHYSICIAN ASSISTANT

## 2023-02-14 PROCEDURE — 36415 COLL VENOUS BLD VENIPUNCTURE: CPT | Performed by: PHYSICIAN ASSISTANT

## 2023-02-14 PROCEDURE — 250N000009 HC RX 250: Performed by: PHYSICIAN ASSISTANT

## 2023-02-14 PROCEDURE — 85610 PROTHROMBIN TIME: CPT | Performed by: PHYSICIAN ASSISTANT

## 2023-02-14 PROCEDURE — C1713 ANCHOR/SCREW BN/BN,TIS/BN: HCPCS | Performed by: ORTHOPAEDIC SURGERY

## 2023-02-14 PROCEDURE — 250N000011 HC RX IP 250 OP 636: Performed by: ANESTHESIOLOGY

## 2023-02-14 PROCEDURE — 97161 PT EVAL LOW COMPLEX 20 MIN: CPT | Mod: GP

## 2023-02-14 PROCEDURE — 250N000009 HC RX 250: Performed by: ANESTHESIOLOGY

## 2023-02-14 PROCEDURE — 85027 COMPLETE CBC AUTOMATED: CPT | Performed by: PHYSICIAN ASSISTANT

## 2023-02-14 PROCEDURE — 250N000013 HC RX MED GY IP 250 OP 250 PS 637: Performed by: INTERNAL MEDICINE

## 2023-02-14 PROCEDURE — 99222 1ST HOSP IP/OBS MODERATE 55: CPT | Performed by: INTERNAL MEDICINE

## 2023-02-14 PROCEDURE — 250N000009 HC RX 250: Performed by: ORTHOPAEDIC SURGERY

## 2023-02-14 PROCEDURE — 250N000011 HC RX IP 250 OP 636

## 2023-02-14 PROCEDURE — 97110 THERAPEUTIC EXERCISES: CPT | Mod: GP

## 2023-02-14 PROCEDURE — 258N000001 HC RX 258: Performed by: ORTHOPAEDIC SURGERY

## 2023-02-14 PROCEDURE — 360N000077 HC SURGERY LEVEL 4, PER MIN: Performed by: ORTHOPAEDIC SURGERY

## 2023-02-14 PROCEDURE — 97530 THERAPEUTIC ACTIVITIES: CPT | Mod: GP

## 2023-02-14 PROCEDURE — 258N000003 HC RX IP 258 OP 636: Performed by: NURSE ANESTHETIST, CERTIFIED REGISTERED

## 2023-02-14 PROCEDURE — 272N000001 HC OR GENERAL SUPPLY STERILE: Performed by: ORTHOPAEDIC SURGERY

## 2023-02-14 PROCEDURE — 250N000011 HC RX IP 250 OP 636: Performed by: NURSE ANESTHETIST, CERTIFIED REGISTERED

## 2023-02-14 PROCEDURE — 250N000013 HC RX MED GY IP 250 OP 250 PS 637: Performed by: PHYSICIAN ASSISTANT

## 2023-02-14 PROCEDURE — 710N000010 HC RECOVERY PHASE 1, LEVEL 2, PER MIN: Performed by: ORTHOPAEDIC SURGERY

## 2023-02-14 PROCEDURE — 258N000003 HC RX IP 258 OP 636: Performed by: PHYSICIAN ASSISTANT

## 2023-02-14 PROCEDURE — 250N000009 HC RX 250: Performed by: NURSE ANESTHETIST, CERTIFIED REGISTERED

## 2023-02-14 PROCEDURE — 999N000065 XR KNEE PORT LEFT 1/2 VIEWS: Mod: LT

## 2023-02-14 PROCEDURE — 370N000017 HC ANESTHESIA TECHNICAL FEE, PER MIN: Performed by: ORTHOPAEDIC SURGERY

## 2023-02-14 PROCEDURE — 258N000003 HC RX IP 258 OP 636: Performed by: ANESTHESIOLOGY

## 2023-02-14 PROCEDURE — 999N000141 HC STATISTIC PRE-PROCEDURE NURSING ASSESSMENT: Performed by: ORTHOPAEDIC SURGERY

## 2023-02-14 PROCEDURE — C1776 JOINT DEVICE (IMPLANTABLE): HCPCS | Performed by: ORTHOPAEDIC SURGERY

## 2023-02-14 DEVICE — P.F.C. SIGMA OVAL DOME PATELLA 3-PEG 35MM CEMENTED
Type: IMPLANTABLE DEVICE | Site: KNEE | Status: FUNCTIONAL
Brand: P.F.C. SIGMA

## 2023-02-14 DEVICE — P.F.C. SIGMA MODULAR STEM CEMENTED 13MM X 30MM
Type: IMPLANTABLE DEVICE | Site: KNEE | Status: FUNCTIONAL
Brand: P.F.C. SIGMA

## 2023-02-14 DEVICE — P.F.C. SIGMA TIBIAL INSERT FIXED BEARING STABILIZED 2.5 17.5MM XLK
Type: IMPLANTABLE DEVICE | Site: KNEE | Status: FUNCTIONAL
Brand: P.F.C. SIGMA

## 2023-02-14 DEVICE — BONE CEMENT SIMPLEX FULL DOSE 6191-1-001: Type: IMPLANTABLE DEVICE | Site: KNEE | Status: FUNCTIONAL

## 2023-02-14 DEVICE — P.F.C. SIGMA TIBIAL TRAY FIXED BEARING MODULAR COCR 2.5 CEMENTED
Type: IMPLANTABLE DEVICE | Site: KNEE | Status: FUNCTIONAL
Brand: P.F.C. SIGMA

## 2023-02-14 DEVICE — SIGMA FEMORAL POSTERIOR STABILIZED CEMENTED SIZE 2.5 LEFT
Type: IMPLANTABLE DEVICE | Site: KNEE | Status: FUNCTIONAL
Brand: SIGMA

## 2023-02-14 RX ORDER — GABAPENTIN 300 MG/1
CAPSULE ORAL
Qty: 30 CAPSULE | Refills: 0 | Status: SHIPPED | OUTPATIENT
Start: 2023-02-14

## 2023-02-14 RX ORDER — HYDROMORPHONE HCL IN WATER/PF 6 MG/30 ML
0.4 PATIENT CONTROLLED ANALGESIA SYRINGE INTRAVENOUS
Status: DISCONTINUED | OUTPATIENT
Start: 2023-02-14 | End: 2023-02-14

## 2023-02-14 RX ORDER — CEFAZOLIN SODIUM 1 G/3ML
INJECTION, POWDER, FOR SOLUTION INTRAMUSCULAR; INTRAVENOUS
Status: DISCONTINUED
Start: 2023-02-14 | End: 2023-02-14 | Stop reason: HOSPADM

## 2023-02-14 RX ORDER — HYDROMORPHONE HCL IN WATER/PF 6 MG/30 ML
0.4 PATIENT CONTROLLED ANALGESIA SYRINGE INTRAVENOUS
Status: DISCONTINUED | OUTPATIENT
Start: 2023-02-14 | End: 2023-02-15 | Stop reason: HOSPADM

## 2023-02-14 RX ORDER — OXYCODONE HYDROCHLORIDE 5 MG/1
5 TABLET ORAL EVERY 4 HOURS PRN
Status: DISCONTINUED | OUTPATIENT
Start: 2023-02-14 | End: 2023-02-15 | Stop reason: HOSPADM

## 2023-02-14 RX ORDER — CYANOCOBALAMIN (VITAMIN B-12) 500 MCG
400 LOZENGE ORAL DAILY
COMMUNITY

## 2023-02-14 RX ORDER — OXYCODONE HYDROCHLORIDE 5 MG/1
5 TABLET ORAL EVERY 4 HOURS PRN
Qty: 26 TABLET | Refills: 0 | Status: SHIPPED | OUTPATIENT
Start: 2023-02-14

## 2023-02-14 RX ORDER — CEFAZOLIN SODIUM/WATER 2 G/20 ML
2 SYRINGE (ML) INTRAVENOUS SEE ADMIN INSTRUCTIONS
Status: DISCONTINUED | OUTPATIENT
Start: 2023-02-14 | End: 2023-02-14 | Stop reason: HOSPADM

## 2023-02-14 RX ORDER — NALOXONE HYDROCHLORIDE 0.4 MG/ML
0.2 INJECTION, SOLUTION INTRAMUSCULAR; INTRAVENOUS; SUBCUTANEOUS
Status: DISCONTINUED | OUTPATIENT
Start: 2023-02-14 | End: 2023-02-15 | Stop reason: HOSPADM

## 2023-02-14 RX ORDER — ATORVASTATIN CALCIUM 40 MG/1
40 TABLET, FILM COATED ORAL AT BEDTIME
COMMUNITY
End: 2023-05-16

## 2023-02-14 RX ORDER — ONDANSETRON 2 MG/ML
4 INJECTION INTRAMUSCULAR; INTRAVENOUS EVERY 6 HOURS PRN
Status: DISCONTINUED | OUTPATIENT
Start: 2023-02-14 | End: 2023-02-15 | Stop reason: HOSPADM

## 2023-02-14 RX ORDER — HYDROMORPHONE HCL IN WATER/PF 6 MG/30 ML
0.2 PATIENT CONTROLLED ANALGESIA SYRINGE INTRAVENOUS
Status: DISCONTINUED | OUTPATIENT
Start: 2023-02-14 | End: 2023-02-15 | Stop reason: HOSPADM

## 2023-02-14 RX ORDER — ACETAMINOPHEN 325 MG/1
975 TABLET ORAL EVERY 8 HOURS
Status: DISCONTINUED | OUTPATIENT
Start: 2023-02-14 | End: 2023-02-15 | Stop reason: HOSPADM

## 2023-02-14 RX ORDER — LIDOCAINE HYDROCHLORIDE 10 MG/ML
INJECTION, SOLUTION INFILTRATION; PERINEURAL PRN
Status: DISCONTINUED | OUTPATIENT
Start: 2023-02-14 | End: 2023-02-14

## 2023-02-14 RX ORDER — CALCIUM CARBONATE 500 MG/1
500 TABLET, CHEWABLE ORAL 4 TIMES DAILY PRN
Status: DISCONTINUED | OUTPATIENT
Start: 2023-02-14 | End: 2023-02-15 | Stop reason: HOSPADM

## 2023-02-14 RX ORDER — AMOXICILLIN 250 MG
1-2 CAPSULE ORAL 2 TIMES DAILY PRN
Qty: 30 TABLET | Refills: 0 | Status: SHIPPED | OUTPATIENT
Start: 2023-02-14 | End: 2024-03-12

## 2023-02-14 RX ORDER — HYDROXYZINE HYDROCHLORIDE 10 MG/1
10 TABLET, FILM COATED ORAL EVERY 6 HOURS PRN
Status: DISCONTINUED | OUTPATIENT
Start: 2023-02-14 | End: 2023-02-15 | Stop reason: HOSPADM

## 2023-02-14 RX ORDER — SODIUM CHLORIDE, SODIUM LACTATE, POTASSIUM CHLORIDE, CALCIUM CHLORIDE 600; 310; 30; 20 MG/100ML; MG/100ML; MG/100ML; MG/100ML
INJECTION, SOLUTION INTRAVENOUS CONTINUOUS
Status: DISCONTINUED | OUTPATIENT
Start: 2023-02-14 | End: 2023-02-14 | Stop reason: HOSPADM

## 2023-02-14 RX ORDER — BISACODYL 10 MG
10 SUPPOSITORY, RECTAL RECTAL DAILY PRN
Status: DISCONTINUED | OUTPATIENT
Start: 2023-02-14 | End: 2023-02-15 | Stop reason: HOSPADM

## 2023-02-14 RX ORDER — HYDROMORPHONE HCL IN WATER/PF 6 MG/30 ML
0.2 PATIENT CONTROLLED ANALGESIA SYRINGE INTRAVENOUS EVERY 5 MIN PRN
Status: DISCONTINUED | OUTPATIENT
Start: 2023-02-14 | End: 2023-02-14 | Stop reason: HOSPADM

## 2023-02-14 RX ORDER — ONDANSETRON 2 MG/ML
4 INJECTION INTRAMUSCULAR; INTRAVENOUS EVERY 30 MIN PRN
Status: DISCONTINUED | OUTPATIENT
Start: 2023-02-14 | End: 2023-02-14 | Stop reason: HOSPADM

## 2023-02-14 RX ORDER — FENTANYL CITRATE 50 UG/ML
25-100 INJECTION, SOLUTION INTRAMUSCULAR; INTRAVENOUS
Status: DISCONTINUED | OUTPATIENT
Start: 2023-02-14 | End: 2023-02-14 | Stop reason: HOSPADM

## 2023-02-14 RX ORDER — DEXAMETHASONE SODIUM PHOSPHATE 10 MG/ML
INJECTION, SOLUTION INTRAMUSCULAR; INTRAVENOUS PRN
Status: DISCONTINUED | OUTPATIENT
Start: 2023-02-14 | End: 2023-02-14

## 2023-02-14 RX ORDER — HYDROMORPHONE HCL IN WATER/PF 6 MG/30 ML
0.4 PATIENT CONTROLLED ANALGESIA SYRINGE INTRAVENOUS EVERY 5 MIN PRN
Status: DISCONTINUED | OUTPATIENT
Start: 2023-02-14 | End: 2023-02-14 | Stop reason: HOSPADM

## 2023-02-14 RX ORDER — CEFAZOLIN SODIUM 1 G/3ML
1 INJECTION, POWDER, FOR SOLUTION INTRAMUSCULAR; INTRAVENOUS EVERY 8 HOURS
Status: COMPLETED | OUTPATIENT
Start: 2023-02-14 | End: 2023-02-15

## 2023-02-14 RX ORDER — FENTANYL CITRATE 50 UG/ML
25 INJECTION, SOLUTION INTRAMUSCULAR; INTRAVENOUS EVERY 5 MIN PRN
Status: DISCONTINUED | OUTPATIENT
Start: 2023-02-14 | End: 2023-02-14 | Stop reason: HOSPADM

## 2023-02-14 RX ORDER — CELECOXIB 200 MG/1
200 CAPSULE ORAL DAILY
Qty: 14 CAPSULE | Refills: 0 | Status: SHIPPED | OUTPATIENT
Start: 2023-02-14 | End: 2023-02-28

## 2023-02-14 RX ORDER — LIDOCAINE 40 MG/G
CREAM TOPICAL
Status: DISCONTINUED | OUTPATIENT
Start: 2023-02-14 | End: 2023-02-15 | Stop reason: HOSPADM

## 2023-02-14 RX ORDER — ACETAMINOPHEN 325 MG/1
650 TABLET ORAL EVERY 4 HOURS PRN
Qty: 100 TABLET | Refills: 0 | Status: SHIPPED | OUTPATIENT
Start: 2023-02-14 | End: 2024-03-12

## 2023-02-14 RX ORDER — ATORVASTATIN CALCIUM 40 MG/1
40 TABLET, FILM COATED ORAL AT BEDTIME
Status: DISCONTINUED | OUTPATIENT
Start: 2023-02-14 | End: 2023-02-15 | Stop reason: HOSPADM

## 2023-02-14 RX ORDER — NITROGLYCERIN 0.4 MG/1
0.4 TABLET SUBLINGUAL EVERY 5 MIN PRN
Status: DISCONTINUED | OUTPATIENT
Start: 2023-02-14 | End: 2023-02-15 | Stop reason: HOSPADM

## 2023-02-14 RX ORDER — OXYCODONE HYDROCHLORIDE 5 MG/1
10 TABLET ORAL EVERY 4 HOURS PRN
Status: DISCONTINUED | OUTPATIENT
Start: 2023-02-14 | End: 2023-02-14 | Stop reason: HOSPADM

## 2023-02-14 RX ORDER — ASPIRIN 81 MG/1
81 TABLET ORAL 2 TIMES DAILY
Qty: 60 TABLET | Refills: 0 | Status: SHIPPED | OUTPATIENT
Start: 2023-02-14

## 2023-02-14 RX ORDER — NALOXONE HYDROCHLORIDE 0.4 MG/ML
0.4 INJECTION, SOLUTION INTRAMUSCULAR; INTRAVENOUS; SUBCUTANEOUS
Status: DISCONTINUED | OUTPATIENT
Start: 2023-02-14 | End: 2023-02-15 | Stop reason: HOSPADM

## 2023-02-14 RX ORDER — ASPIRIN 81 MG/1
81 TABLET ORAL 2 TIMES DAILY
Status: DISCONTINUED | OUTPATIENT
Start: 2023-02-14 | End: 2023-02-15 | Stop reason: HOSPADM

## 2023-02-14 RX ORDER — LIDOCAINE 40 MG/G
CREAM TOPICAL
Status: DISCONTINUED | OUTPATIENT
Start: 2023-02-14 | End: 2023-02-14 | Stop reason: HOSPADM

## 2023-02-14 RX ORDER — PROPOFOL 10 MG/ML
INJECTION, EMULSION INTRAVENOUS CONTINUOUS PRN
Status: DISCONTINUED | OUTPATIENT
Start: 2023-02-14 | End: 2023-02-14

## 2023-02-14 RX ORDER — BUPIVACAINE HYDROCHLORIDE 7.5 MG/ML
INJECTION, SOLUTION INTRASPINAL
Status: COMPLETED | OUTPATIENT
Start: 2023-02-14 | End: 2023-02-14

## 2023-02-14 RX ORDER — POLYETHYLENE GLYCOL 3350 17 G/17G
17 POWDER, FOR SOLUTION ORAL DAILY
Status: DISCONTINUED | OUTPATIENT
Start: 2023-02-15 | End: 2023-02-15 | Stop reason: HOSPADM

## 2023-02-14 RX ORDER — SODIUM CHLORIDE, SODIUM LACTATE, POTASSIUM CHLORIDE, CALCIUM CHLORIDE 600; 310; 30; 20 MG/100ML; MG/100ML; MG/100ML; MG/100ML
INJECTION, SOLUTION INTRAVENOUS CONTINUOUS
Status: DISCONTINUED | OUTPATIENT
Start: 2023-02-14 | End: 2023-02-15 | Stop reason: HOSPADM

## 2023-02-14 RX ORDER — ACETAMINOPHEN 325 MG/1
650 TABLET ORAL EVERY 4 HOURS PRN
Status: DISCONTINUED | OUTPATIENT
Start: 2023-02-17 | End: 2023-02-15 | Stop reason: HOSPADM

## 2023-02-14 RX ORDER — ONDANSETRON 4 MG/1
4 TABLET, ORALLY DISINTEGRATING ORAL EVERY 6 HOURS PRN
Status: DISCONTINUED | OUTPATIENT
Start: 2023-02-14 | End: 2023-02-15 | Stop reason: HOSPADM

## 2023-02-14 RX ORDER — MAGNESIUM HYDROXIDE 1200 MG/15ML
LIQUID ORAL PRN
Status: DISCONTINUED | OUTPATIENT
Start: 2023-02-14 | End: 2023-02-14 | Stop reason: HOSPADM

## 2023-02-14 RX ORDER — CEFAZOLIN SODIUM 1 G/3ML
INJECTION, POWDER, FOR SOLUTION INTRAMUSCULAR; INTRAVENOUS PRN
Status: DISCONTINUED | OUTPATIENT
Start: 2023-02-14 | End: 2023-02-14 | Stop reason: HOSPADM

## 2023-02-14 RX ORDER — KETAMINE HYDROCHLORIDE 10 MG/ML
INJECTION INTRAMUSCULAR; INTRAVENOUS PRN
Status: DISCONTINUED | OUTPATIENT
Start: 2023-02-14 | End: 2023-02-14

## 2023-02-14 RX ORDER — FENTANYL CITRATE 50 UG/ML
50 INJECTION, SOLUTION INTRAMUSCULAR; INTRAVENOUS EVERY 5 MIN PRN
Status: DISCONTINUED | OUTPATIENT
Start: 2023-02-14 | End: 2023-02-14 | Stop reason: HOSPADM

## 2023-02-14 RX ORDER — OXYCODONE HYDROCHLORIDE 5 MG/1
10 TABLET ORAL EVERY 4 HOURS PRN
Status: DISCONTINUED | OUTPATIENT
Start: 2023-02-14 | End: 2023-02-15 | Stop reason: HOSPADM

## 2023-02-14 RX ORDER — GABAPENTIN 300 MG/1
300 CAPSULE ORAL AT BEDTIME
Status: DISCONTINUED | OUTPATIENT
Start: 2023-02-14 | End: 2023-02-15 | Stop reason: HOSPADM

## 2023-02-14 RX ORDER — CEFAZOLIN SODIUM/WATER 2 G/20 ML
2 SYRINGE (ML) INTRAVENOUS
Status: COMPLETED | OUTPATIENT
Start: 2023-02-14 | End: 2023-02-14

## 2023-02-14 RX ORDER — ASCORBIC ACID 500 MG
500 TABLET ORAL DAILY
COMMUNITY

## 2023-02-14 RX ORDER — CELECOXIB 200 MG/1
400 CAPSULE ORAL ONCE
Status: COMPLETED | OUTPATIENT
Start: 2023-02-14 | End: 2023-02-14

## 2023-02-14 RX ORDER — ACETAMINOPHEN 325 MG/1
975 TABLET ORAL ONCE
Status: COMPLETED | OUTPATIENT
Start: 2023-02-14 | End: 2023-02-14

## 2023-02-14 RX ORDER — ONDANSETRON 4 MG/1
4 TABLET, ORALLY DISINTEGRATING ORAL EVERY 30 MIN PRN
Status: DISCONTINUED | OUTPATIENT
Start: 2023-02-14 | End: 2023-02-14 | Stop reason: HOSPADM

## 2023-02-14 RX ORDER — CALCIUM CARBONATE/VITAMIN D3 600 MG-10
1 TABLET ORAL DAILY
COMMUNITY

## 2023-02-14 RX ORDER — OXYCODONE HYDROCHLORIDE 5 MG/1
5 TABLET ORAL EVERY 4 HOURS PRN
Status: DISCONTINUED | OUTPATIENT
Start: 2023-02-14 | End: 2023-02-14 | Stop reason: HOSPADM

## 2023-02-14 RX ORDER — BUPIVACAINE HYDROCHLORIDE 5 MG/ML
INJECTION, SOLUTION EPIDURAL; INTRACAUDAL
Status: COMPLETED | OUTPATIENT
Start: 2023-02-14 | End: 2023-02-14

## 2023-02-14 RX ORDER — CELECOXIB 100 MG/1
100 CAPSULE ORAL 2 TIMES DAILY
Status: DISCONTINUED | OUTPATIENT
Start: 2023-02-14 | End: 2023-02-15 | Stop reason: HOSPADM

## 2023-02-14 RX ORDER — FENTANYL CITRATE 50 UG/ML
25 INJECTION, SOLUTION INTRAMUSCULAR; INTRAVENOUS
Status: DISCONTINUED | OUTPATIENT
Start: 2023-02-14 | End: 2023-02-14

## 2023-02-14 RX ORDER — PROCHLORPERAZINE MALEATE 5 MG
5 TABLET ORAL EVERY 6 HOURS PRN
Status: DISCONTINUED | OUTPATIENT
Start: 2023-02-14 | End: 2023-02-15 | Stop reason: HOSPADM

## 2023-02-14 RX ORDER — HYDROMORPHONE HCL IN WATER/PF 6 MG/30 ML
0.2 PATIENT CONTROLLED ANALGESIA SYRINGE INTRAVENOUS
Status: DISCONTINUED | OUTPATIENT
Start: 2023-02-14 | End: 2023-02-14

## 2023-02-14 RX ORDER — CARBOXYMETHYLCELLULOSE SODIUM 5 MG/ML
1 SOLUTION/ DROPS OPHTHALMIC 2 TIMES DAILY
Status: DISCONTINUED | OUTPATIENT
Start: 2023-02-14 | End: 2023-02-15 | Stop reason: HOSPADM

## 2023-02-14 RX ORDER — ONDANSETRON 2 MG/ML
INJECTION INTRAMUSCULAR; INTRAVENOUS PRN
Status: DISCONTINUED | OUTPATIENT
Start: 2023-02-14 | End: 2023-02-14

## 2023-02-14 RX ORDER — AMOXICILLIN 250 MG
1 CAPSULE ORAL 2 TIMES DAILY
Status: DISCONTINUED | OUTPATIENT
Start: 2023-02-14 | End: 2023-02-15 | Stop reason: HOSPADM

## 2023-02-14 RX ORDER — TRANEXAMIC ACID 650 MG/1
1950 TABLET ORAL ONCE
Status: COMPLETED | OUTPATIENT
Start: 2023-02-14 | End: 2023-02-14

## 2023-02-14 RX ADMIN — PROPOFOL 100 MCG/KG/MIN: 10 INJECTION, EMULSION INTRAVENOUS at 07:47

## 2023-02-14 RX ADMIN — ACETAMINOPHEN 975 MG: 325 TABLET ORAL at 22:53

## 2023-02-14 RX ADMIN — SODIUM CHLORIDE, POTASSIUM CHLORIDE, SODIUM LACTATE AND CALCIUM CHLORIDE: 600; 310; 30; 20 INJECTION, SOLUTION INTRAVENOUS at 08:31

## 2023-02-14 RX ADMIN — GABAPENTIN 300 MG: 300 CAPSULE ORAL at 20:49

## 2023-02-14 RX ADMIN — BUPIVACAINE HYDROCHLORIDE 15 ML: 5 INJECTION, SOLUTION EPIDURAL; INTRACAUDAL; PERINEURAL at 06:58

## 2023-02-14 RX ADMIN — ATORVASTATIN CALCIUM 40 MG: 40 TABLET, FILM COATED ORAL at 20:48

## 2023-02-14 RX ADMIN — SENNOSIDES AND DOCUSATE SODIUM 1 TABLET: 50; 8.6 TABLET ORAL at 20:48

## 2023-02-14 RX ADMIN — CELECOXIB 400 MG: 200 CAPSULE ORAL at 06:06

## 2023-02-14 RX ADMIN — PHENYLEPHRINE HYDROCHLORIDE 100 MCG: 10 INJECTION INTRAVENOUS at 08:33

## 2023-02-14 RX ADMIN — LIDOCAINE HYDROCHLORIDE 2 ML: 10 INJECTION, SOLUTION INFILTRATION; PERINEURAL at 07:47

## 2023-02-14 RX ADMIN — ASPIRIN 81 MG: 81 TABLET, COATED ORAL at 20:49

## 2023-02-14 RX ADMIN — TRANEXAMIC ACID 1950 MG: 650 TABLET ORAL at 06:06

## 2023-02-14 RX ADMIN — CARBOXYMETHYLCELLULOSE SODIUM 1 DROP: 5 SOLUTION/ DROPS OPHTHALMIC at 20:52

## 2023-02-14 RX ADMIN — ACETAMINOPHEN 975 MG: 325 TABLET ORAL at 14:30

## 2023-02-14 RX ADMIN — Medication 2 G: at 07:35

## 2023-02-14 RX ADMIN — ONDANSETRON 4 MG: 2 INJECTION INTRAMUSCULAR; INTRAVENOUS at 08:55

## 2023-02-14 RX ADMIN — SODIUM CHLORIDE, POTASSIUM CHLORIDE, SODIUM LACTATE AND CALCIUM CHLORIDE: 600; 310; 30; 20 INJECTION, SOLUTION INTRAVENOUS at 07:35

## 2023-02-14 RX ADMIN — PHENYLEPHRINE HYDROCHLORIDE 100 MCG: 10 INJECTION INTRAVENOUS at 08:49

## 2023-02-14 RX ADMIN — CEFAZOLIN 1 G: 1 INJECTION, POWDER, FOR SOLUTION INTRAMUSCULAR; INTRAVENOUS at 16:31

## 2023-02-14 RX ADMIN — CELECOXIB 100 MG: 100 CAPSULE ORAL at 18:05

## 2023-02-14 RX ADMIN — SODIUM CHLORIDE, POTASSIUM CHLORIDE, SODIUM LACTATE AND CALCIUM CHLORIDE: 600; 310; 30; 20 INJECTION, SOLUTION INTRAVENOUS at 14:32

## 2023-02-14 RX ADMIN — MIDAZOLAM HYDROCHLORIDE 1 MG: 1 INJECTION, SOLUTION INTRAMUSCULAR; INTRAVENOUS at 06:54

## 2023-02-14 RX ADMIN — PHENYLEPHRINE HYDROCHLORIDE 0.3 MCG/KG/MIN: 10 INJECTION INTRAVENOUS at 08:05

## 2023-02-14 RX ADMIN — DEXAMETHASONE SODIUM PHOSPHATE 10 MG: 10 INJECTION, SOLUTION INTRAMUSCULAR; INTRAVENOUS at 07:53

## 2023-02-14 RX ADMIN — PHENYLEPHRINE HYDROCHLORIDE 100 MCG: 10 INJECTION INTRAVENOUS at 08:05

## 2023-02-14 RX ADMIN — BUPIVACAINE HYDROCHLORIDE IN DEXTROSE 1.6 ML: 7.5 INJECTION, SOLUTION SUBARACHNOID at 07:45

## 2023-02-14 RX ADMIN — KETAMINE HYDROCHLORIDE 20 MG: 10 INJECTION, SOLUTION INTRAMUSCULAR; INTRAVENOUS at 07:50

## 2023-02-14 RX ADMIN — ACETAMINOPHEN 975 MG: 325 TABLET ORAL at 06:06

## 2023-02-14 RX ADMIN — PHENYLEPHRINE HYDROCHLORIDE 100 MCG: 10 INJECTION INTRAVENOUS at 08:19

## 2023-02-14 RX ADMIN — FENTANYL CITRATE 50 MCG: 50 INJECTION, SOLUTION INTRAMUSCULAR; INTRAVENOUS at 06:54

## 2023-02-14 ASSESSMENT — ACTIVITIES OF DAILY LIVING (ADL)
ADLS_ACUITY_SCORE: 24

## 2023-02-14 NOTE — ANESTHESIA PREPROCEDURE EVALUATION
Anesthesia Pre-Procedure Evaluation    Patient: Malinda Beckham   MRN: [id number] : 1945        Procedure : Procedure(s):  LEFT TOTAL KNEE ARTHROPLASTY          Past Medical History:   Diagnosis Date     Antiplatelet or antithrombotic long-term use      Coronary artery disease     stent      Coronary artery disease involving native coronary artery of native heart without angina pectoris 2016     Essential hypertension 2016     Myocardial infarction (H)     NSTEMI      NSTEMI (non-ST elevated myocardial infarction) (H) 10/18/2013     Osteoarthritis of knee      Pure hypercholesterolemia      S/P right coronary artery (RCA) stent placement 2017     Stented coronary artery       Past Surgical History:   Procedure Laterality Date     CATARACT EXTRACTION Right      CORONARY STENT PLACEMENT       HC LEFT HEART CATHETERIZATION  10/18/13    Stent to RCA     lens implant surgery       ZZC TOTAL KNEE ARTHROPLASTY Right 3/2/2018    Procedure: RIGHT TOTAL KNEE ARTHROPLASTY;  Surgeon: Vinnie De Santiago MD;  Location: [facility] OR;  Service: Orthopedics      Allergies   Allergen Reactions     Lisinopril Cough     adverse reaction      Social History     Tobacco Use     Smoking status: Former     Types: Cigarettes     Quit date:      Years since quittin.1     Smokeless tobacco: Never   Substance Use Topics     Alcohol use: Yes     Comment: 1-2 a day      Wt Readings from Last 1 Encounters:   23 55.3 kg (122 lb)        Anesthesia Evaluation            ROS/MED HX  ENT/Pulmonary:  - neg pulmonary ROS     Neurologic:  - neg neurologic ROS     Cardiovascular:     (+) Dyslipidemia hypertension--CAD --stent-    METS/Exercise Tolerance:     Hematologic:       Musculoskeletal:   (+) arthritis,     GI/Hepatic:  - neg GI/hepatic ROS     Renal/Genitourinary:  - neg Renal ROS     Endo:  - neg endo ROS     Psychiatric/Substance Use:  - neg psychiatric ROS     Infectious Disease:        Malignancy:       Other:            Physical Exam    Airway  airway exam normal           Respiratory Devices and Support         Dental       (+) Minor Abnormalities - some fillings, tiny chips      Cardiovascular   cardiovascular exam normal          Pulmonary   pulmonary exam normal                OUTSIDE LABS:  CBC:   Lab Results   Component Value Date    WBC 12.3 (H) 02/14/2023    WBC 17.0 (H) 11/28/2021    HGB 12.4 02/14/2023    HGB 13.7 03/08/2022    HCT 37.4 02/14/2023    HCT 44.2 11/28/2021     02/14/2023     11/28/2021     BMP:   Lab Results   Component Value Date     05/05/2022     03/08/2022    POTASSIUM 4.3 05/05/2022    POTASSIUM 4.6 03/08/2022    CHLORIDE 109 05/05/2022    CHLORIDE 108 03/08/2022    CO2 26 05/05/2022    CO2 27 03/08/2022    BUN 22 05/05/2022    BUN 18 03/08/2022    CR 0.67 05/05/2022    CR 0.78 03/08/2022     (H) 05/05/2022     (H) 03/08/2022     COAGS:   Lab Results   Component Value Date    PTT 25 10/17/2013    INR 0.99 02/14/2023     POC: No results found for: BGM, HCG, HCGS  HEPATIC:   Lab Results   Component Value Date    ALBUMIN 3.6 03/08/2022    PROTTOTAL 6.9 03/08/2022    ALT 32 03/08/2022    AST 28 03/08/2022    ALKPHOS 84 03/08/2022    BILITOTAL 0.9 03/08/2022     OTHER:   Lab Results   Component Value Date    LACT 1.2 10/24/2015    A1C 5.1 04/17/2018    DAMASO 9.3 05/05/2022    MAG 2.5 (H) 03/08/2022    TSH 2.80 03/08/2022    T4 1.08 11/28/2021       Anesthesia Plan    ASA Status:  3   NPO Status:  NPO Appropriate    Anesthesia Type: Spinal.              Consents    Anesthesia Plan(s) and associated risks, benefits, and realistic alternatives discussed. Questions answered and patient/representative(s) expressed understanding.    - Discussed:     - Discussed with:  Patient         Postoperative Care    Pain management: Peripheral nerve block (Single Shot), Multi-modal analgesia.   PONV prophylaxis: Ondansetron (or other 5HT-3),  Dexamethasone or Solumedrol     Comments:                MCKINLEY HERNÁNDEZ MD

## 2023-02-14 NOTE — ANESTHESIA PROCEDURE NOTES
"Intrathecal injection Procedure Note    Pre-Procedure   Staff -        Anesthesiologist:  Andi Aguiar MD       Performed By: anesthesiologist       Location: OR       Procedure Start/Stop Times: 2/14/2023 7:40 AM and 2/14/2023 7:45 AM       Pre-Anesthestic Checklist: patient identified, IV checked, risks and benefits discussed, informed consent, monitors and equipment checked and pre-op evaluation  Timeout:       Correct Patient: Yes        Correct Procedure: Yes        Correct Site: Yes        Correct Position: Yes   Procedure Documentation  Procedure: intrathecal injection       Patient Position: sitting       Patient Prep/Sterile Barriers: sterile gloves, mask, patient draped       Skin prep: Chloraprep       Insertion Site: L3-4. (midline approach).       Needle Gauge: 24.        Needle Length (Inches): 4        Spinal Needle Type: Rudi tip       Introducer used    Assessment/Narrative         Paresthesias: No.       CSF fluid: clear.    Medication(s) Administered   0.75% Hyperbaric Bupivacaine (Intrathecal) - Intrathecal   1.6 mL - 2/14/2023 7:45:00 AM  Medication Administration Time: 2/14/2023 7:40 AM      FOR [facility] ([facility]) ONLY:   Pain Team Contact information: please page the Pain Team Via [employer]. Search \"Pain\". During daytime hours, please page the attending first. At night please page the resident first.    "

## 2023-02-14 NOTE — OP NOTE
DATE OF SERVICE: 2/14/2023    PREOPERATIVE DIAGNOSIS: Left Knee Osteoarthritis    POSTOPERATIVE DIAGNOSIS: Left Knee Osteoarthritis    PROCEDURE: Left Total Knee Arthroplasty    SURGEON: Alexis Green MD    ASSISTANT: Marianne Esteves PA-C; CHARLIE assist was essential and required for all portions of the case, including patient positioning, soft tissue retraction, patient safety, assistance with closure of the wound, and postoperative care    ANESTHESIA: Spinal + adductor canal block    EBL: 100cc    COMPLICATIONS: None    FINDINGS:  The operative knee showed a severe full-thickness cartilage loss on the femur and tibia in the medial and lateral compartment of the knee.  The patellofemoral joint also showed advanced arthritic changes.      IMPLANTS:  1. Depuy Sigma Femur Size 2.5  2. Depuy Sigma FB Tibia Size 2.5  3. 17.5 mm PS FB Poly  4. 35 mm oval patella    INDICATION FOR PROCEDURE:    Ms. Malinda Beckham is a pleasant 77 year old-year-old female with an ongoing history of increasing and progressive pain in the left knee with severe disability. Pain and disability due to knee arthritis are severely affecting quality of life and ability to perform even simple activities of daily living.  X-rays have shown bone-on-bone degenerative change. Consequently after trying and failing all conservative management of knee arthritis, the risks including, but not limited to, bleeding, infection, nerve injury, dvt, implant failure, implant wear, fracture, anesthetic complications, heart attack, stroke, and even death were discussed.  Pt verbalized understanding.  Informed consent was obtained.    DESCRIPTION OF PROCEDURE: The patient was seen and evaluated in the preop area. Discussion of the surgery and any further questions were answered with the patient and family using easily understandable non-medical terms. The correct site was identified with the patient, and I placed my initials at the surgical site. Pre-procedure verification  was completed. Relevant information / documentation available, they were reviewed and properly matched to the patient.  I verified the consent was accurate and complete.  I verified that the proper surgical equipment and supplies were available. The patient was taken to the operating room and placed in a supine position according to the procedure in consideration with all extremities well padded. The patient received preoperative prophylactic antibiotics based on the patient s procedure, BMI, and allergy profile.  A Time Out was conducted just prior to starting procedure to verify the eight required elements: 1-patient identity, 2-consent accurate and complete, 3-position, 4-correct side/site marked (if applicable), 5-procedure, 6-relevant images / results properly labeled and displayed (if applicable), 7-antibiotics / irrigation fluids (if applicable), 8-safety precautions.      The patient was given successful  spinal anesthesia.  The operative lower extremity was then prepped and draped in sterile fashion. The leg was covered with a Ioban type drape.  I then made a straight longitudinal incision over the patella, starting 2 cm proximal to the patella extending to the tibial tubercle.   A median parapatellar arthrotomy was performed.  The patella was translated and the knee was flexed.  There was significant degenerative changes throughout the knee.  I felt it was reasonable to proceed with the planned procedure.  Osteophytes were removed with a rongeur.  Any hypertrophic/inflamed synovium and a portion of the infrapatella fat pad was excised.  Z retractors were placed medially and laterally.  The condition of the PCL was evaluated and the decision to proceed with PCL retaining or sacrificing knee was determined.  The Intramedullary Guide was placed into the femur.  It was set at 4 degrees of Valgus based on preoperative measurements.  It was set to remove 9 mm of distal bone.  The distal cut was then made using  the oscillating saw.  We then placed the  referencing  femoral sizing guide on the distal femur which was set at 3 degrees of external rotation, which was parallel to the epicondylar axis and perpendicular to Whitesides line.   It was sized to the appropriate size femoral component and guide pins were placed appropriately.  The appropriate 4 in 1 cutting block was then pinned to the distal femur.  The comma guide was used to assess the anterior and posterior cuts to make sure there would be no anterior notching and proper posterior cuts would be made.  The anterior, posterior and chamfer cuts were made using the oscillating saw.  The appropriate notch guide was placed on the distal femur and the notch was cut using the oscillating saw. A trial femoral component was placed on the distal femur to assess for proper fit.    Attention was then turned to the proximal Tibia.  Any remaining meniscal tissue was excised.  Z retractors were placed to protect the collateral ligaments.  A PCL retractor was placed carefully posterior to the tibia to assist in translating the tibia anteriorly.  The extramedullary  tibial guide was placed on the tibia.  The appropriate varus valgus alignment was achieved aligning the guide with the midline of the tibia and the 2nd metatarsal.  The slope was set to the patient s natural slope.  The tibial resection level was measured using the stylus and set to remove zero mm of bone from the deficient side. The guide was pinned in place.  The tibial cut was then performed using the oscillating saw.  Careful attention was placed on protecting the collateral ligaments and the posterior knee during the cutting process.   The Tibia was then sized to the appropriate tibial component size for maximal tibial coverage.    Extension and flexion gap balancing was performed using lamina spreaders and flexion/extension spacer blocks.  The appropriate ligament releases were performed.  A moderate medial release  was needed for proper balancing.    Trial components were then placed into the knee.  The tibia trial was free floating to determine the best tibia component rotation, which was then marked on the tibia. This was approximately at the junction of the medial and middle thirds of the tibial tubercle.   The appropriate thickness of poly was determined which allowed for full extension and flexion to 125 degrees without any significant instability.  The knee was tested for varus/valgus stability at 0 and 20 degrees of flexion as well as anterior/posterior stability throughout the flexion arc.  Careful attention was also placed on overall alignment in the A/P and M/L planes.     We then turned our attention to patella preparation.  The thickness of the patella was measured with a caliper.  The appropriate amount of bone resection was performed with the patella cutting jig.  The patella was then measured again using the caliper.  The appropriate thickness of patella was chosen that would provide good coverage and equal the thickness of the natural patella.  The patella lug holes were then drilled.  We then put the knee through range of motion testing.  The patella appeared to track properly.  No lateral release was needed.    The trial components were removed and attention was turned to tibial preparation. The tibial trial was placed on the tibia for maximum coverage and aligned with the previous  tibial rotation markings.  The tray was pinned in place.  The tower was placed on the tray and the appropriate reaming was performed.  The keel punch impactor was then used to punch the keel.    Sclerotic bone was prepared by drilling small holes to improve cement interdigitation.  The bony surfaces were then irrigated and cleansed thoroughly with pulsatile lavage antibiotic saline.  Cement was then applied to the tibia.  The tibial tray was then implanted and impacted in securely.  Any extruded cement was removed with a curette  or freer elevator.  Cement was applied to the femur.  The femoral implant was impacted onto the distal femur paying careful attention not to flex or extend the implant.  Any extruded cement was removed with a curette or freer elevator.  The patella component was cemented and held securely with the patella clamp.  Any extruded cement was removed with a curette or freer elevator.  A trial poly was placed into the knee and the knee was reduced and held in extension while the cement polymerized.   Once the polymerization process was complete, any excess cement was removed with a small osteotome and mallet.   The knee was then irrigated and cleansed thoroughly with pulsatile lavage antibiotic saline.  The permanent poly component was impacted securely into the tibial tray.  The knee was reduced.  There was unrestricted range of motion from 0 to 125 degrees with proper patella tracking. Then knee was stable to varus/valgus stress testing at 0 and 20 degrees.  There was no apparent anterior/posterior instability during flexion.    We checked for any bleeding once again, hemostasis was achieved with electrocautery.  The quadriceps mechanism and medial retinaculum were reapproximated  with number #1 Fiberwire and #1 Stratafix suture . The knee was fully flexed to a confirm patella tracking and the integrity of the capsular closing.  The subcutaneous tissue was closed with 2-0 Vicryl suture, skin with 3-0 monocryl suture and dermabond.  A sterile dressing and Martín sock were placed on the leg.  The sponge and needle counts were correct at the end of the case.  The patient left the operating room in stable condition.      Post Op Plan:   Pain Control: Adductor canal block, IV Narcotics, appropriate antiinflammatories, Gabapentin  Infection Prophylaxis:  IV Antibiotics as per their BMI and allergy profile  for 24 hrs  DVT Propylaxis: ECASA 81mg PO BID x 4 weeks, mechanical devices.  PT/OT: Weight Bearing as Tolerated with Walker.    Eval and treat as per TKA protocol  Medical management: Primary medicine service consult  Incision/Dressing Care: Keep on, clean. Reniforce prn.  OK to shower.  Reapply ACE wrap after shower/skin checks.    Disposition:  Consult as needed for disposition      Implant Name Type Inv. Item Serial No.  Lot No. LRB No. Used Action   BONE CEMENT SIMPLEX FULL DOSE 6191-1-001 - [id number] Cement, Bone BONE CEMENT SIMPLEX FULL DOSE 6191-1-001  [employer] [id number] Left 1 Implanted   BONE CEMENT SIMPLEX FULL DOSE 6191-1-001 - [id number] Cement, Bone BONE CEMENT SIMPLEX FULL DOSE 6191-1-001  [employer] [id number] Left 1 Implanted   IMP COMP FEM DEPUY POST STAB SIGMA SZ 2.5 LT 1960- - [id number] Total Joint Component/Insert IMP COMP FEM DEPUY POST STAB SIGMA SZ 2.5 LT 1960-  [employer]- [id number] Left 1 Implanted   IMP COMP TIBTRY SGM 2.5 KN COCR 1581- - [id number] Total Joint Component/Insert IMP COMP TIBTRY SGM 2.5 KN COCR 1581-  [employer]- [id number] Left 1 Implanted   IMP COMP PATELLA DEPUY 3 POST OVAL 35MM  - [id number] Total Joint Component/Insert IMP COMP PATELLA DEPUY 3 POST OVAL 35MM   [employer]- [id number] Left 1 Implanted   IMP STEM TIB/FEM EXT DEPUY PFC SIGMA SZ 2/3 30MM  - [id number] Total Joint Component/Insert IMP STEM TIB/FEM EXT DEPUY PFC SIGMA SZ 2/3 30MM   [employer]- [id number] Left 1 Implanted   INSERT TIBIA SIGMA STAB XLOCK 2.5 15MM - [id number] Total Joint Component/Insert INSERT TIBIA SIGMA STAB XLOCK 2.5 15MM  [employer]- [id number] Left 1 Wasted   INSERT TIBIA STAB XP 2.5 17.5MM - [id number] Total Joint Component/Insert INSERT TIBIA STAB XP 2.5 17.5MM  [employer]- [id number] Left 1 Implanted         @C(1)@  Alexis Green MD    @C(2)@  [facility] Hospital[facility]ertrude

## 2023-02-14 NOTE — INTERVAL H&P NOTE
"I have reviewed the surgical (or preoperative) H&P that is linked to this encounter, and examined the patient. There are no significant changes    Clinical Conditions Present on Arrival:  Clinically Significant Risk Factors Present on Admission                    # Severe Obesity: Estimated body mass index is 22,668.42 kg/m  as calculated from the following:    Height as of this encounter: 0.05 m (1.97\").    Weight as of 2/9/23: 56.7 kg (125 lb).       "

## 2023-02-14 NOTE — PLAN OF CARE
Goal Outcome Evaluation:  Patient vital signs are at baseline: Yes  Patient able to ambulate as they were prior to admission or with assist devices provided by therapies during their stay:  No,  Reason:  hasn't been up with PT at this time.   Patient MUST void prior to discharge:  No,  Reason:  due to void  Patient able to tolerate oral intake:  Yes  Pain has adequate pain control using Oral analgesics:  Yes  Does patient have an identified :  Yes  Has goal D/C date and time been discussed with patient:  Yes    Returned from PACU at 1330. Comfortable and chatty. CMS intact.

## 2023-02-14 NOTE — CONSULTS
Internal Medicine Consultation   Malinda Beckham,  1945, MRN [id number]    [facility]  Knee osteoarthritis [M17.9]  Knee pain, left [M25.562]  S/P total knee arthroplasty [Z96.659]    PCP: Gertrude Serrano, [unfilled], [phone]   Code status:  Prior       Extended Emergency Contact Information  Primary Emergency Contact: Michael Beckham  Address: [address] [place] [address] [place] [address] [facility]  Work Phone: [phone]  Mobile Phone: [phone]  Relation: Spouse  Secondary Emergency Contact: Stephanie Kwong   [facility]  Home Phone: [phone]  Work Phone: [phone]  Mobile Phone: [phone]  Relation: Daughter    Requesting Provider: Alexis Green MD     Assessment and Plan   Coronary Artery Disease  -History of previous NSTEMI in  S/P stenting to RCA: Stable  -Resume home atorvastatin/aspirin/as needed NTG.  Hold beta-blocker/ARBs in the setting of marginal BP  -Close monitor.    Hypercholesterolemia  -Home atorvastatin     Essential hypertension  -BP low normal.  -Takes metoprolol/losartan at home.  We will hold for now.  Plan to restart as indicated.    Glaucoma  -Resume home Restasis    Left knee end-stage osteoarthritis  -POD #0 status post left TKA.  -Postprocedure condition stable  -Defer pain control/DVT prophylaxis to primary service (Ortho surgery)  -PT/OT eval/Rx    DVT Prophylaxis: Per primary service.  We will continue to follow this patient while in-house.  Hospital medical service appreciates this consult.       Chief Complaint:  Left knee pain     HPI:    Malinda Beckham is a 77 year old old female with medical history significant for hyperlipidemia, hypertension, coronary artery disease status post previous STEMI with subsequent stent placement to RCA, and glaucoma who is POD #0 status post left total knee arthroplasty secondary to end-stage osteoarthritis which apparently had failed medical therapy requiring treatment with surgery.  At the time of my  visit, patient denies any new complaints.  No fever or chills, cough or sputum production, dyspnea, chest pain, nausea or vomiting, palpitations, abdominal pains, diarrhea or any other constitutional symptoms.  Patient endorses adequate pain control at this time.  Hospital medicine service has been consulted to assist with medical comanagement.     History is provided by patient and review of EMR.       [facility]  [facility]     Surgical History  She  has a past surgical history that includes lens implant surgery; LEFT HEART CATHETERIZATION (10/18/13); Cataract Extraction (Right); Coronary Stent Placement (2013); and TOTAL KNEE ARTHROPLASTY (Right, 3/2/2018).       Social History  Reviewed, and she  reports that she quit smoking about 56 years ago. Her smoking use included cigarettes. She has never used smokeless tobacco. She reports current alcohol use. She reports that she does not use drugs.       Allergies  Allergies   Allergen Reactions     Lisinopril Cough     adverse reaction    Family History  Reviewed, and family history includes C.A.D. in her brother and another family member; C.A.D. (age of onset: 59) in her father; Cancer (age of onset: 69) in an other family member; Cerebrovascular Disease (age of onset: 79) in her mother; Hypertension in her mother.          Prior to Admission Medications   Medications Prior to Admission   Medication Sig Dispense Refill Last Dose     aspirin (ASA) 81 MG EC tablet Take 162 mg by mouth 2 times daily 1 tablet 0 2/9/2023     atorvastatin (LIPITOR) 40 MG tablet Take 40 mg by mouth At Bedtime   2/13/2023 at PM     Biotin 1 MG CAPS Take 1 capsule by mouth 2 times daily   2/7/2023     calcium carbonate-vitamin D (CALTRATE) 600-10 MG-MCG per tablet Take 1 tablet by mouth daily   2/7/2023     cyanocobalamin 1000 MCG SUBL Place 1,000 mcg under the tongue daily   2/7/2023     cycloSPORINE (RESTASIS) 0.05 % ophthalmic emulsion Place 1 drop into both eyes 2 times daily    "2/14/2023 at AM     losartan (COZAAR) 25 MG tablet Take 1 tablet (25 mg) by mouth 2 times daily 180 tablet 3 2/11/2023     MAGNESIUM PO Take 1 tablet by mouth daily   2/7/2023     melatonin 5 MG tablet Take 5 mg by mouth every evening   2/13/2023 at PM     metoprolol tartrate (LOPRESSOR) 50 MG tablet Take 0.5 tablets (25 mg) by mouth 2 times daily 90 tablet 3 2/13/2023     nitroglycerin (NITROSTAT) 0.4 MG SL tablet Place 1 tablet (0.4 mg) under the tongue every 5 minutes as needed for chest pain 25 tablet 0 Unknown     vitamin C (ASCORBIC ACID) 500 MG tablet Take 500 mg by mouth daily   2/7/2023     vitamin E 400 units TABS Take 400 Units by mouth daily   2/7/2023          Review of Systems:  A 12 point comprehensive review of systems was negative except as noted. Physical Exam:  Temp:  [96.5  F (35.8  C)-98.6  F (37  C)] 97.3  F (36.3  C)  Pulse:  [52-76] 58  Resp:  [9-22] 12  BP: (106-152)/(53-77) 114/56  SpO2:  [94 %-100 %] 96 %    /56   Pulse 58   Temp 97.3  F (36.3  C)   Resp 12   Ht (!) 0.05 m (1.97\")   Wt 55.3 kg (122 lb)   LMP  (LMP Unknown)   SpO2 96%   BMI 14363.42 kg/m      General Appearance:   Awake, alert and oriented x3.  No obvious distress.   Head:    Normocephalic, without obvious abnormality, atraumatic   Eyes:    PERRL, conjunctiva/corneas clear, EOM's intact,both eyes    Ears:    Normal external ear canals no drainage or erythema bilat.   Nose:   Nares normal by gross inspection,  mucosa normal, no drainage or sinus tenderness   Throat:   Lips, mucosa, and tongue normal; teeth and gums normal   Neck:   Supple, symmetrical, trachea midline, no adenopathy;        thyroid:  No enlargement/tenderness/nodules   Back:     Symmetric, no curvature, ROM normal, no CVA tenderness   Lungs:    Bilaterally clear to auscultation.  No wheezes, rales or rhonchi.   Chest wall:    No tenderness or deformity   Heart:    Regular rate and rhythm, S1 and S2 normal, I/VI systolic murmur, no rubs, no JVD, " no edema   Abdomen:     Soft, non-tender, bowel sounds active all four quadrants,     no masses, no hepatosplenomegaly   Musculoskeletal:   Extremities are warm and non-tender, atraumatic, no joint swelling or tenderness   Pulses:   2+ and symmetric all extremities   Skin:   Skin color, texture, turgor normal, no rashes or lesions on exposed areas, please see nursing assessment for full skin assessment   Neurologic:  CN II to XII WNL.  No focal neurodeficits.        Pertinent Labs  Lab Results: personally reviewed.   No results for input(s): NA, CO2, BUN, CREATININE, ALBUMIN, BILITOT, ALKPHOS, ALT, AST, GLUCOSE in the last 168 hours.    Invalid input(s): K, CL, CALCIUM, LABALBU, PROT, MG  Recent Labs   Lab 02/14/23  0629   WBC 12.3*   HGB 12.4   HCT 37.4        No results for input(s): CKTOTAL, TROPONINI in the last 168 hours.    Invalid input(s): TROPONINT, CKMBINDEX    MOST RECENT A1c, Iron, TIBC, Coags, TFTs  Lab Results   Component Value Date    INR 0.99 02/14/2023    PTT 25 10/17/2013     No results found for: IRON  Lab Results   Component Value Date    TSH 2.80 03/08/2022       I spent a total of 52 minutes of this encounter with interval history, physical exam, in patient medication review and reconciliation      Juventino Velez DO  Internal Medicine Hospitalist  2/14/2023

## 2023-02-14 NOTE — ANESTHESIA CARE TRANSFER NOTE
Patient: Malinda Beckham    Procedure: Procedure(s):  LEFT TOTAL KNEE ARTHROPLASTY       Diagnosis: Knee osteoarthritis [M17.9]  Knee pain, left [M25.562]  Diagnosis Additional Information: No value filed.    Anesthesia Type:   Spinal     Note:    Oropharynx: oropharynx clear of all foreign objects  Level of Consciousness: awake  Oxygen Supplementation: face mask  Level of Supplemental Oxygen (L/min / FiO2): 6  Independent Airway: airway patency satisfactory and stable  Dentition: dentition unchanged  Vital Signs Stable: post-procedure vital signs reviewed and stable  Report to RN Given: handoff report given  Patient transferred to: PACU    Handoff Report: Identifed the Patient, Identified the Reponsible Provider, Reviewed the pertinent medical history, Discussed the surgical course, Reviewed Intra-OP anesthesia mangement and issues during anesthesia, Set expectations for post-procedure period and Allowed opportunity for questions and acknowledgement of understanding      Vitals:  Vitals Value Taken Time   /53 02/14/23 0921   Temp 35.8  C (96.5  F) 02/14/23 0920   Pulse 78 02/14/23 0921   Resp 15 02/14/23 0920   SpO2 100 % 02/14/23 0921   Vitals shown include unvalidated device data.    Electronically Signed By: MARYJO Mirza CRNA  February 14, 2023  9:23 AM

## 2023-02-14 NOTE — PHARMACY-ADMISSION MEDICATION HISTORY
Pharmacy Note - Admission Medication History    Pertinent Provider Information: N/A   ______________________________________________________________________    Prior To Admission (PTA) med list completed and updated in EMR.       PTA Med List   Medication Sig Last Dose     aspirin (ASA) 81 MG EC tablet Take 162 mg by mouth 2 times daily 2/9/2023     atorvastatin (LIPITOR) 40 MG tablet Take 40 mg by mouth At Bedtime 2/13/2023 at PM     Biotin 1 MG CAPS Take 1 capsule by mouth 2 times daily 2/7/2023     calcium carbonate-vitamin D (CALTRATE) 600-10 MG-MCG per tablet Take 1 tablet by mouth daily 2/7/2023     cyanocobalamin 1000 MCG SUBL Place 1,000 mcg under the tongue daily 2/7/2023     cycloSPORINE (RESTASIS) 0.05 % ophthalmic emulsion Place 1 drop into both eyes 2 times daily 2/14/2023 at AM     losartan (COZAAR) 25 MG tablet Take 1 tablet (25 mg) by mouth 2 times daily 2/11/2023     MAGNESIUM PO Take 1 tablet by mouth daily 2/7/2023     melatonin 5 MG tablet Take 5 mg by mouth every evening 2/13/2023 at PM     metoprolol tartrate (LOPRESSOR) 50 MG tablet Take 0.5 tablets (25 mg) by mouth 2 times daily 2/13/2023     nitroglycerin (NITROSTAT) 0.4 MG SL tablet Place 1 tablet (0.4 mg) under the tongue every 5 minutes as needed for chest pain Unknown     vitamin C (ASCORBIC ACID) 500 MG tablet Take 500 mg by mouth daily 2/7/2023     vitamin E 400 units TABS Take 400 Units by mouth daily 2/7/2023       Information source(s): Patient and Clinic records    Method of interview communication: in-person    Patient was asked about OTC/herbal products specifically.  PTA med list reflects this.    Based on the pharmacist's assessment, the PTA med list information appears reliable    Medication Affordability:  Not including over the counter (OTC) medications, was there a time in the past 12 months when you did not take your medications as prescribed because of cost?: No    Allergies were reviewed, assessed, and updated with the  patient.      Patient does not use any multi-dose medications prior to admission.     Thank you for the opportunity to participate in the care of this patient.      Albaro Olivo [facility]     2/14/2023     6:36 AM

## 2023-02-14 NOTE — ANESTHESIA POSTPROCEDURE EVALUATION
Patient: Malinda Beckham    Procedure: Procedure(s):  LEFT TOTAL KNEE ARTHROPLASTY       Anesthesia Type:  Spinal    Note:  Disposition: Outpatient   Postop Pain Control: Uneventful            Sign Out: Well controlled pain   PONV: No   Neuro/Psych: Uneventful            Sign Out: Acceptable/Baseline neuro status   Airway/Respiratory: Uneventful            Sign Out: Acceptable/Baseline resp. status   CV/Hemodynamics: Uneventful            Sign Out: Acceptable CV status; No obvious hypovolemia; No obvious fluid overload   Other NRE: NONE   DID A NON-ROUTINE EVENT OCCUR? No           Last vitals:  Vitals Value Taken Time   /53 02/14/23 1000   Temp 36.3  C (97.3  F) 02/14/23 0940   Pulse 65 02/14/23 1013   Resp 12 02/14/23 1000   SpO2 99 % 02/14/23 1013   Vitals shown include unvalidated device data.    Electronically Signed By: MCKINLEY HERNÁNDEZ MD  February 14, 2023  10:15 AM

## 2023-02-14 NOTE — ANESTHESIA PROCEDURE NOTES
"Adductor canal Procedure Note    Pre-Procedure   Staff -        Anesthesiologist:  Andi Aguiar MD       Performed By: anesthesiologist       Location: pre-op       Procedure Start/Stop Times: 2/14/2023 6:53 AM and 2/14/2023 6:58 AM       Pre-Anesthestic Checklist: patient identified, IV checked, site marked, risks and benefits discussed, informed consent, monitors and equipment checked, pre-op evaluation, at physician/surgeon's request and post-op pain management  Timeout:       Correct Patient: Yes        Correct Procedure: Yes        Correct Site: Yes        Correct Position: Yes        Correct Laterality: Yes        Site Marked: Yes  Procedure Documentation  Procedure: Adductor canal       Laterality: left       Patient Position: supine       Patient Prep/Sterile Barriers: sterile gloves, mask       Skin prep: Chloraprep       Needle Type: short bevel       Needle Gauge: 20.        Needle Length (Inches): 4        Ultrasound guided       1. Ultrasound was used to identify targeted nerve, plexus, vascular marker, or fascial plane and place a needle adjacent to it in real-time.       2. Ultrasound was used to visualize the spread of anesthetic in close proximity to the above referenced structure.       3. A permanent image is entered into the patient's record.    Assessment/Narrative         The placement was negative for: blood aspirated, painful injection and site bleeding       Paresthesias: No.       Bolus given via needle..        Secured via.        Insertion/Infusion Method: Single Shot       Complications: none    Medication(s) Administered   Bupivacaine 0.5% PF (Infiltration) - Infiltration   15 mL - 2/14/2023 6:58:00 AM  Medication Administration Time: 2/14/2023 6:53 AM      FOR [facility] ([facility]) ONLY:   Pain Team Contact information: please page the Pain Team Via [employer]. Search \"Pain\". During daytime hours, please page the attending first. At night please page the resident first.    "

## 2023-02-14 NOTE — PROGRESS NOTES
02/14/23 4513   Appointment Info   Signing Clinician's Name / Credentials (PT) Sridevi Pressley, PT, DPT   Quick Adds   Quick Adds Certification   Living Environment   People in Home spouse   Current Living Arrangements house   Home Accessibility stairs within home   Number of Stairs, Within Home, Primary seven  (7 stairs to upper level, 6 stairs to lower level)   Stair Railings, Within Home, Primary railings safe and in good condition;railing on right side (ascending)   Transportation Anticipated family or friend will provide   Living Environment Comments Split level entry   Self-Care   Usual Activity Tolerance good   Current Activity Tolerance moderate   Equipment Currently Used at Home crutches;cane, straight   Fall history within last six months yes   Number of times patient has fallen within last six months 2   Activity/Exercise/Self-Care Comment Patient reports previous independence with mobility and ADLs. Does cleaning and laundry,  does cooking.   General Information   Onset of Illness/Injury or Date of Surgery 02/14/23   Referring Physician Alexis Geren MD   Patient/Family Therapy Goals Statement (PT) To go home   Pertinent History of Current Problem (include personal factors and/or comorbidities that impact the POC) Status post L TKA 2/14/23   Existing Precautions/Restrictions fall;weight bearing   Weight-Bearing Status - LLE weight-bearing as tolerated   Weight-Bearing Status - RLE full weight-bearing   Cognition   Affect/Mental Status (Cognition) WFL   Orientation Status (Cognition) oriented x 4   Follows Commands (Cognition) WFL   Safety Deficit (Cognition) impulsivity   Range of Motion (ROM)   Range of Motion ROM deficits secondary to surgical procedure;ROM deficits secondary to pain;ROM deficits secondary to weakness   Strength (Manual Muscle Testing)   Strength (Manual Muscle Testing) strength is WFL   Bed Mobility   Bed Mobility supine-sit   Supine-Sit [place] (Bed Mobility)  independent   Assistive Device (Bed Mobility) bed rails   Transfers   Transfers sit-stand transfer   Maintains Weight-bearing Status (Transfers) able to maintain   Sit-Stand Transfer   Sit-Stand [place] (Transfers) supervision   Assistive Device (Sit-Stand Transfers) walker, front-wheeled   Gait/Stairs (Locomotion)   [place] Level (Gait) contact guard;1 person to manage equipment   Assistive Device (Gait) walker, front-wheeled   Distance in Feet 15   Pattern (Gait) step-to   Deviations/Abnormal Patterns (Gait) antalgic;base of support, narrow;jessica decreased;festinating/shuffling;gait speed decreased;stride length decreased;weight shifting decreased  (Demonstrated valgus knee position in standing and during weight bearing.)   Maintains Weight-bearing Status (Gait) able to maintain   Clinical Impression   Criteria for Skilled Therapeutic Intervention Yes, treatment indicated   PT Diagnosis (PT) Impaired functional mobiltiy   Influenced by the following impairments post op status, pain, weakness   Functional limitations due to impairments Transfers, gait, stairs   Clinical Presentation (PT Evaluation Complexity) Evolving/Changing   Clinical Presentation Rationale Patient presents as medically diagnosed   Clinical Decision Making (Complexity) low complexity   Planned Therapy Interventions (PT) gait training;home exercise program;neuromuscular re-education;patient/family education;stair training;strengthening;transfer training   Anticipated Equipment Needs at Discharge (PT) walker, rolling;crutches, axillary   Risk & Benefits of therapy have been explained evaluation/treatment results reviewed;care plan/treatment goals reviewed;participants voiced agreement with care plan;participants included;patient   PT Total Evaluation Time   PT Eval, Low Complexity Minutes (71269) 10   Therapy Certification   Start of care date 02/14/23   Certification date from 02/14/23   Certification date to 02/21/23   Medical  Diagnosis L TKA   Physical Therapy Goals   PT Frequency Daily   PT Predicted Duration/Target Date for Goal Attainment 02/21/23   PT Goals Transfers;Gait;Stairs   PT: Transfers Modified independent;Sit to/from stand;Bed to/from chair;Assistive device;Within precautions   PT: Gait Modified independent;Assistive device;150 feet   PT: Stairs 7 stairs;Supervision/stand-by assist;Rail on right   Interventions   Interventions Quick Adds Therapeutic Activity;Therapeutic Procedure   Therapeutic Procedure/Exercise   Ther. Procedure: strength, endurance, ROM, flexibillity Minutes (76004) 15   Symptoms Noted During/After Treatment fatigue;increased pain   Treatment Detail/Skilled Intervention Patient performed seated BLE TKA exercises x 10 with verbal cueing and visual demonstration required for proper technique.   Therapeutic Activity   Therapeutic Activities: dynamic activities to improve functional performance Minutes (16861) 13   Symptoms Noted During/After Treatment Fatigue;Increased pain   Treatment Detail/Skilled Intervention Patient supine in bed on arrival, agreeable to therapy. Assistance with line management during bed mobility. Verbal cueing for hand placement for safe transfer, no carry over observed. Assistance with line management. Patient demonstrated impulsive behaviors during sit to stand transfer. Chair transfer x 2 with CGA x 1 and FWW, verbal cueing for hand placement and safe transfer technique, no carryover observed. Toilet transfer with CGA x 1 and FWW, verbal cueing for hand placement and safe transfer technique. Patient seated in bedside chair with call light within reach and chair alarm armed at end of session.   PT Discharge Planning   PT Plan Transfers, gait with crutches, stairs, TKA HEP   PT Discharge Recommendation (DC Rec) home;home with assist;home with outpatient physical therapy   PT Rationale for DC Rec Patient currently requries assist of 1 for transfers and gait. She lives in a house with  her  who is able to provide assistance if needed. She has 7 stairs to reach her bedroom; at this time she has not attempted stairs. She has access to crutches and a cane at home and uses it the crutches at baseline. Anticipate patient will be safe to discharge home with assist from family and use of an assistive device for safe mobility.   PT Brief overview of current status Assist of 1 for transfers and gait.   Total Session Time   Timed Code Treatment Minutes 28   Total Session Time (sum of timed and untimed services) 38     [facility]  OUTPATIENT PHYSICAL THERAPY EVALUATION  PLAN OF TREATMENT FOR OUTPATIENT REHABILITATION  (COMPLETE FOR INITIAL CLAIMS ONLY)  Patient's Last Name, First Name, M.I.  YOB: 1945  Malinda Beckham                        Provider's Name  [facility] Medical Record No.  [id number]                             Onset Date:  02/14/23   Start of Care Date:  02/14/23   Type:     _X_PT   ___OT   ___SLP Medical Diagnosis:  L TKA              PT Diagnosis:  Impaired functional mobiltiy Visits from SOC:  1     See note for plan of treatment, functional goals and certification details    I CERTIFY THE NEED FOR THESE SERVICES FURNISHED UNDER        THIS PLAN OF TREATMENT AND WHILE UNDER MY CARE     (Physician co-signature of this document indicates review and certification of the therapy plan).              Sridevi Pressley, PT, DPT

## 2023-02-15 ENCOUNTER — APPOINTMENT (OUTPATIENT)
Dept: OCCUPATIONAL THERAPY | Facility: CLINIC | Age: 78
End: 2023-02-15
Attending: ORTHOPAEDIC SURGERY
Payer: COMMERCIAL

## 2023-02-15 ENCOUNTER — APPOINTMENT (OUTPATIENT)
Dept: PHYSICAL THERAPY | Facility: CLINIC | Age: 78
End: 2023-02-15
Attending: ORTHOPAEDIC SURGERY
Payer: COMMERCIAL

## 2023-02-15 VITALS
HEIGHT: 60 IN | HEART RATE: 89 BPM | OXYGEN SATURATION: 98 % | BODY MASS INDEX: 23.95 KG/M2 | WEIGHT: 122 LBS | TEMPERATURE: 97.8 F | DIASTOLIC BLOOD PRESSURE: 77 MMHG | SYSTOLIC BLOOD PRESSURE: 142 MMHG | RESPIRATION RATE: 16 BRPM

## 2023-02-15 LAB
FASTING STATUS PATIENT QL REPORTED: NO
GLUCOSE BLD-MCNC: 154 MG/DL (ref 70–125)
HGB BLD-MCNC: 11.3 G/DL (ref 11.7–15.7)

## 2023-02-15 PROCEDURE — 250N000013 HC RX MED GY IP 250 OP 250 PS 637: Performed by: INTERNAL MEDICINE

## 2023-02-15 PROCEDURE — 97166 OT EVAL MOD COMPLEX 45 MIN: CPT | Mod: GO

## 2023-02-15 PROCEDURE — 99207 PR CDG-CUT & PASTE-POTENTIAL IMPACT ON LEVEL: CPT | Performed by: INTERNAL MEDICINE

## 2023-02-15 PROCEDURE — 97116 GAIT TRAINING THERAPY: CPT | Mod: GP

## 2023-02-15 PROCEDURE — 36415 COLL VENOUS BLD VENIPUNCTURE: CPT | Performed by: PHYSICIAN ASSISTANT

## 2023-02-15 PROCEDURE — 97535 SELF CARE MNGMENT TRAINING: CPT | Mod: GO

## 2023-02-15 PROCEDURE — 250N000011 HC RX IP 250 OP 636: Performed by: PHYSICIAN ASSISTANT

## 2023-02-15 PROCEDURE — 250N000013 HC RX MED GY IP 250 OP 250 PS 637: Performed by: PHYSICIAN ASSISTANT

## 2023-02-15 PROCEDURE — 82947 ASSAY GLUCOSE BLOOD QUANT: CPT | Performed by: ORTHOPAEDIC SURGERY

## 2023-02-15 PROCEDURE — 99232 SBSQ HOSP IP/OBS MODERATE 35: CPT | Performed by: INTERNAL MEDICINE

## 2023-02-15 PROCEDURE — 85018 HEMOGLOBIN: CPT | Performed by: PHYSICIAN ASSISTANT

## 2023-02-15 RX ORDER — METOPROLOL TARTRATE 25 MG/1
25 TABLET, FILM COATED ORAL 2 TIMES DAILY
Status: DISCONTINUED | OUTPATIENT
Start: 2023-02-15 | End: 2023-02-15 | Stop reason: HOSPADM

## 2023-02-15 RX ORDER — LOSARTAN POTASSIUM 25 MG/1
25 TABLET ORAL 2 TIMES DAILY
Status: DISCONTINUED | OUTPATIENT
Start: 2023-02-15 | End: 2023-02-15 | Stop reason: HOSPADM

## 2023-02-15 RX ADMIN — METOPROLOL TARTRATE 25 MG: 25 TABLET, FILM COATED ORAL at 10:51

## 2023-02-15 RX ADMIN — CARBOXYMETHYLCELLULOSE SODIUM 1 DROP: 5 SOLUTION/ DROPS OPHTHALMIC at 08:24

## 2023-02-15 RX ADMIN — ACETAMINOPHEN 975 MG: 325 TABLET ORAL at 05:19

## 2023-02-15 RX ADMIN — CEFAZOLIN 1 G: 1 INJECTION, POWDER, FOR SOLUTION INTRAMUSCULAR; INTRAVENOUS at 00:50

## 2023-02-15 RX ADMIN — Medication 1000 MCG: at 08:24

## 2023-02-15 RX ADMIN — SENNOSIDES AND DOCUSATE SODIUM 1 TABLET: 50; 8.6 TABLET ORAL at 08:24

## 2023-02-15 RX ADMIN — CELECOXIB 100 MG: 100 CAPSULE ORAL at 05:20

## 2023-02-15 RX ADMIN — POLYETHYLENE GLYCOL 3350 17 G: 17 POWDER, FOR SOLUTION ORAL at 08:24

## 2023-02-15 RX ADMIN — LOSARTAN POTASSIUM 25 MG: 25 TABLET, FILM COATED ORAL at 10:51

## 2023-02-15 RX ADMIN — Medication 1 TABLET: at 08:24

## 2023-02-15 RX ADMIN — ASPIRIN 81 MG: 81 TABLET, COATED ORAL at 08:24

## 2023-02-15 ASSESSMENT — ACTIVITIES OF DAILY LIVING (ADL)
ADLS_ACUITY_SCORE: 25
ADLS_ACUITY_SCORE: 24

## 2023-02-15 NOTE — PLAN OF CARE
"Patient vital signs are at baseline: Yes  Patient able to ambulate as they were prior to admission or with assist devices provided by therapies during their stay:  Yes - the pt refused to walk in the hallways but ambulated x 2 with this writer to the bathroom. She has an unsteady gate where her left leg does not align directly below her hip. Pt reports she feels steady but she appears to \"hobble\".   Patient MUST void prior to discharge:  Yes  Patient able to tolerate oral intake:  Yes  Pain has adequate pain control using Oral analgesics:  Yes  Does patient have an identified :  Yes  Has goal D/C date and time been discussed with patient:  Yes - Noted bruising and swelling on the surgical knee around the dressing. ICE applied.  Pt would like to be an early discharge, discharge pending therapies.     "

## 2023-02-15 NOTE — PROGRESS NOTES
"   02/15/23 0730   Appointment Info   Signing Clinician's Name / Credentials (OT) MARCOS Amador/L   Quick Adds   Quick Adds Certification   Living Environment   People in Home spouse   Current Living Arrangements house   Self-Care   Usual Activity Tolerance good   Current Activity Tolerance moderate   Equipment Currently Used at Home raised toilet seat;shower chair  (vanity near toilet;tub and walk-in shower on different levels.)   Fall history within last six months yes   Number of times patient has fallen within last six months 2  (\"it was at night without lights.\")   Activity/Exercise/Self-Care Comment see PT note   General Information   Onset of Illness/Injury or Date of Surgery 02/14/23   Referring Physician Dr. Alexis Green   Patient/Family Therapy Goal Statement (OT) home with spouse.   Additional Occupational Profile Info/Pertinent History of Current Problem mod: TKA   Existing Precautions/Restrictions weight bearing;fall   Left Lower Extremity (Weight-bearing Status) weight-bearing as tolerated (WBAT)   General Observations and Info pt states, \"I'm stubborn\".   Cognitive Status Examination   Orientation Status orientation to person, place and time   Visual Perception   Visual Impairment/Limitations WFL   Sensory   Sensory Quick Adds sensation intact   Pain Assessment   Patient Currently in Pain No   Posture   Posture not impaired   Range of Motion Comprehensive   General Range of Motion no range of motion deficits identified   Strength Comprehensive (MMT)   General Manual Muscle Testing (MMT) Assessment   (grossly intact)   Muscle Tone Assessment   Muscle Tone Quick Adds No deficits were identified   Coordination   Upper Extremity Coordination No deficits were identified   Bed Mobility   Comment (Bed Mobility) SBA   Transfers   Transfer Comments SBA   Balance   Balance Comments decreased   Activities of Daily Living   BADL Assessment/Intervention lower body dressing   Lower Body Dressing " Assessment/Training   [place] Level (Lower Body Dressing) supervision   Clinical Impression   Criteria for Skilled Therapeutic Interventions Met (OT) Yes, treatment indicated   OT Diagnosis decr ADL indep/safety   OT Problem List-Impairments impacting ADL balance;activity tolerance impaired;post-surgical precautions   Assessment of Occupational Performance 3-5 Performance Deficits   Identified Performance Deficits dressing, home mgmt, bathing   Planned Therapy Interventions (OT) ADL retraining;balance training;bed mobility training;transfer training   Clinical Decision Making Complexity (OT) moderate complexity   Risk & Benefits of therapy have been explained care plan/treatment goals reviewed;patient   OT Total Evaluation Time   OT Eval, Moderate Complexity Minutes (51469) 10   Therapy Certification   Medical Diagnosis TKA   Start of Care Date 02/15/23   Certification date from 02/15/23   Certification date to 03/15/23   OT Goals   Therapy Frequency (OT) One time eval and treatment   OT Predicted Duration/Target Date for Goal Attainment 02/15/23   OT Goals Lower Body Dressing;Bed Mobility   OT: Lower Body Dressing Modified independent;Goal Met;Completed   OT: Bed Mobility Modified independent;Goal Met;Completed   Interventions   Interventions Quick Adds Self-Care/Home Management   Self-Care/Home Management   Self-Care/Home Mgmt/ADL, Compensatory, Meal Prep Minutes (73917) 23   Symptoms Noted During/After Treatment (Meal Preparation/Planning Training) none   Treatment Detail/Skilled Intervention Educated pt in all transfers including bed, toilet, chair and shower. Pt mod I with FWW for bed, toilet, and chair with cues for tech after instruction. Educ in LE dressing with precautions for post-surgery. Pt mod I for LE dressing including pants/underwear/socks using AE after instruction.  UB dress with mod I after set-up. Bed mobility mod I with AE after instruction and educ in safe positioning. Handout given. All  questions answered. All tasks requ'd incr time/effort second to pain/fatigue.   OT Discharge Planning   OT Plan OT d/c   OT Discharge Recommendation (DC Rec) (S)  home with assist   OT Rationale for DC Rec may need assist for I/ADL   OT Brief overview of current status mod I for BADL   Total Session Time   Timed Code Treatment Minutes 23   Total Session Time (sum of timed and untimed services) 33    [facility]  OUTPATIENT OCCUPATIONAL THERAPY  EVALUATION  PLAN OF TREATMENT FOR OUTPATIENT REHABILITATION  (COMPLETE FOR INITIAL CLAIMS ONLY)  Patient's Last Name, First Name, M.I.  YOB: 1945  Malinda Beckham                          Provider's Name  [facility] Medical Record No.  [id number]                             Onset Date:  02/14/23   Start of Care Date:  02/15/23   Type:     ___PT   _X_OT   ___SLP Medical Diagnosis:  TKA                    OT Diagnosis:  decr ADL indep/safety Visits from SOC:  1     See note for plan of treatment, functional goals and certification details    I CERTIFY THE NEED FOR THESE SERVICES FURNISHED UNDER        THIS PLAN OF TREATMENT AND WHILE UNDER MY CARE     (Physician co-signature of this document indicates review and certification of the therapy plan).

## 2023-02-15 NOTE — PROGRESS NOTES
Physical Therapy Discharge Summary    Reason for therapy discharge:    Discharged to home with outpatient therapy.    Progress towards therapy goal(s). See goals on Care Plan in [facility] electronic health record for goal details.  Goals partially met.  Barriers to achieving goals:   discharge from facility.    Therapy recommendation(s):    Continued therapy is recommended.  Rationale/Recommendations:  OP PT.

## 2023-02-15 NOTE — PROGRESS NOTES
Occupational Therapy Discharge Summary    Reason for therapy discharge:    Discharged to home.    Progress towards therapy goal(s). See goals on Care Plan in [facility] electronic health record for goal details.  Goals met    Therapy recommendation(s):    No further therapy is recommended.

## 2023-02-15 NOTE — DISCHARGE SUMMARY
ORTHOPEDIC DISCHARGE SUMMARY       Malinda Beckham,  1945, MRN [id number]    Admission Date: 2023      Admission Diagnoses: Knee osteoarthritis [M17.9]  Knee pain, left [M25.562]  S/P total knee arthroplasty [Z96.659]     Discharge Date:  02/15/23     Post-operative Day:  1 Day Post-Op    Reason for Admission: The patient was admitted for the following: Procedure(s):  LEFT TOTAL KNEE ARTHROPLASTY    BRIEF HOSPITAL COURSE   Malinda Beckham is a pleasant 77 year old female who underwent the aforementioned procedure with Dr. Green on 23. There were no intraoperative complications and the patient was transferred to the recovery room and later the orthopedic unit in stable condition. Once the patient reached the orthopedic floor our orthopedic pain protocol was implemented along with the following:    Anticoagulation Medications: ASA  Therapy: PT and OT  Activity: WBAT  Bracing: None    Consultations during Admission: Hospitalist service for medical management     COMPLICATIONS/SIGNIFICANT FINDINGS    NONE    DISCHARGE INFORMATION   Condition at discharge: Good  Discharge destination: Home  Patient was seen by myself on the date of discharge.    FOLLOW UP CARE   Follow up with orthopedics in 2 weeks or sooner should the need arise. Ortho will continue to manage pain control, post op anticoagulation and incision care.     Follow up with your PCP for management of chronic medical problems and to evaluate for post op medical complications including constipation, nausea/vomiting, DVT/PE, anemia, changes in blood pressure, fevers/chills, urinary retention and atelectasis/pneumonia.     Subjective   Patient is doing well on POD #1. Pain is well controlled with oral medications. Ambulating. Tolerating oral intake.     Physical Exam   BP (!) 153/79 (BP Location: Right arm, Patient Position: Sitting)   Pulse 80   Temp 97.8  F (36.6  C) (Oral)   Resp 16   Ht 1.524 m (5')   Wt 55.3 kg (122 lb)   LMP  (LMP  Unknown)   SpO2 98%   BMI 24.41 kg/m    The patient is A&Ox3. Appears comfortable, sitting up at bedside.  Sensation is intact to light touch & equal bilaterally in the L2 through S1 dermatomes.  Calves are soft and non-tender.  Dorsiflexion and plantar flexion is intact bilaterally.  Appropriate flexion and extension of the toes bilaterally.   Brisk capillary refill in the toes bilaterally.   Palpable left  dorsalis pedis pulse.  Left  knee dressing C/D/I.     Pertinent Results at Discharge     Hemoglobin   Date/Time Value Ref Range Status   02/15/2023 08:21 AM 11.3 (L) 11.7 - 15.7 g/dL Final   02/14/2023 06:29 AM 12.4 11.7 - 15.7 g/dL Final   03/08/2022 10:09 AM 13.7 11.7 - 15.7 g/dL Final   04/17/2018 12:00 AM 13.2 12.0 - 15.5 g/dL Final   10/23/2015 10:15 PM 13.6 11.7 - 15.7 g/dL Final   01/11/2015 12:00 AM 14.1 11.7 - 15.7 gm/dL Final     INR   Date/Time Value Ref Range Status   02/14/2023 06:29 AM 0.99 0.85 - 1.15 Final   10/18/2013 08:20 AM 1.05 0.86 - 1.14 Final   10/17/2013 11:26 PM 0.92 0.86 - 1.14 Final     Platelet Count   Date/Time Value Ref Range Status   02/14/2023 06:29  150 - 450 10e3/uL Final   11/28/2021 04:19  150 - 450 10e3/uL Final   04/17/2018 12:00  150 - 430 10^9/L Final   10/23/2015 10:15  150 - 450 10e9/L Final   01/11/2015 12:00  150 - 450 10^9/L Final   10/20/2013 05:25  150 - 450 10e9/L Final       Problem List   Active Problems:    * No active hospital problems. *      Nishi Hyman PA-C/Dr. Green  [facility]  [phone]  Date: 2/15/2023  Time: 9:07 AM

## 2023-02-15 NOTE — CONSULTS
Care Management Initial Consult    General Information  Assessment completed with:  ,  patient        Communication Assessment  Patient's communication style: spoken language (English or Bilingual)    Hearing Difficulty or Deaf: yes   Wear Glasses or Blind: yes    Cognitive  Cognitive/Neuro/Behavioral: WDL                      Living Environment:   People in home: sibling(s), spouse     Current living Arrangements:        Able to return to prior arrangements:         Family/Social Support:  Care provided by: self, spouse/significant other  Provides care for:  noone                Description of Support System:    Good, supportive        Community Resources:    Equipment currently used at home: raised toilet seat, shower chair (vanity near toilet;tub and walk-in shower on different levels.)  Supplies currently used at home:           Lifestyle & Psychosocial Needs:  Social Determinants of Health     Tobacco Use: Medium Risk     Smoking Tobacco Use: Former     Smokeless Tobacco Use: Never     Passive Exposure: Not on file   Alcohol Use: Not on file   Financial Resource Strain: Not on file   Food Insecurity: Not on file   Transportation Needs: Not on file   Physical Activity: Not on file   Stress: Not on file   Social Connections: Not on file   Intimate Partner Violence: Not on file   Depression: Not at risk     PHQ-2 Score: 0   Housing Stability: Not on file         Additional Information:  Patient lives with  in their home. Has good support at home. Patient will be discharging home, with family support. Family will be providing ride at discharge.       Yoana Melissa RN

## 2023-02-15 NOTE — PROGRESS NOTES
Hospitalist Progress Note    Assessment/Plan    Coronary Artery Disease  -History of previous NSTEMI in 2013 S/P stenting to RCA: Stable  -Resume home atorvastatin/aspirin/as needed NTG.  Hold beta-blocker/ARBs in the setting of marginal BP  -Close monitor.     Hypercholesterolemia  -Home atorvastatin     Essential hypertension  -BP controlled.  -Home medications     Glaucoma  -Resume home Restasis     Left knee end-stage osteoarthritis  -POD #1 status post left TKA.  -Postprocedure condition stable  -Defer pain control/DVT prophylaxis to primary service (Ortho surgery)  -PT/OT eval/Rx     DVT Prophylaxis: Per primary service.  We will continue to follow this patient if in-house, otherwise patient is medically stable for discharge to backside of care.      Subjective  Patient denies any new complaints.  No acute events overnight.  Awaiting discharge.    Objective    Vital signs in last 24 hours  Temp:  [96.5  F (35.8  C)-98.3  F (36.8  C)] 97.8  F (36.6  C)  Pulse:  [58-89] 80  Resp:  [9-22] 16  BP: (104-153)/(52-79) 153/79  SpO2:  [85 %-100 %] 98 % @LASTSAO2(12)@ O2 Device: None (Room air)    Weight:   Wt Readings from Last 3 Encounters:   01/30/23 55.3 kg (122 lb)   02/09/23 56.7 kg (125 lb)   03/08/22 59 kg (130 lb)      Weight change:     Intake/Output last 3 shifts  I/O last 3 completed shifts:  In: 2740 [P.O.:1440; I.V.:1300]  Out: 1500 [Urine:1400; Blood:100]  Body mass index is 24.41 kg/m .    Physical Exam    General Appearance:    Alert, cooperative, no distress, appears stated age   Lungs:     Clear bilaterally    Cardiovascular:    Regular rate ands rhythm.  Normal S1, S2.  No murmur, rub or gallop.  No edema   Abdomen:     Soft, non-tender, bowel sounds active all four quadrants,     no masses, no organomegaly   Neurologic:   Awake, alert, oriented x 3.  Grossly nonfocal      Pertinent Labs   Lab Results: personally reviewed.   No results for input(s): NA, CO2, BUN, CREATININE, ALBUMIN, BILITOT,  ALKPHOS, ALT, AST, GLUCOSE in the last 168 hours.    Invalid input(s): K, CL, CALCIUM, LABALBU, PROT, MG  Recent Labs   Lab 02/15/23  0821 02/14/23  0629   WBC  --  12.3*   HGB 11.3* 12.4   HCT  --  37.4   PLT  --  252     No results for input(s): CKTOTAL, TROPONINI in the last 168 hours.    Invalid input(s): TROPONINT, CKMBINDEX  Invalid input(s): POCGLUFGR    Medications  Current Facility-Administered Medications   Medication     [START ON 2/17/2023] acetaminophen (TYLENOL) tablet 650 mg     acetaminophen (TYLENOL) tablet 975 mg     aspirin EC tablet 81 mg     atorvastatin (LIPITOR) tablet 40 mg     benzocaine-menthol (CEPACOL) 15-3.6 MG lozenge 1 lozenge     bisacodyl (DULCOLAX) suppository 10 mg     calcium carbonate (TUMS) chewable tablet 500 mg     calcium carbonate-vitamin D (OSCAL) 500-5 MG-MCG per tablet 1 tablet     carboxymethylcellulose PF (REFRESH PLUS) 0.5 % ophthalmic solution 1 drop     celecoxib (celeBREX) capsule 100 mg     cyanocobalamin (VITAMIN B-12) sublingual tablet 1,000 mcg     gabapentin (NEURONTIN) capsule 300 mg     HYDROmorphone (DILAUDID) injection 0.2 mg    Or     HYDROmorphone (DILAUDID) injection 0.4 mg     hydrOXYzine (ATARAX) tablet 10 mg     lactated ringers infusion     lidocaine (LMX4) cream     lidocaine 1 % 0.1-1 mL     magnesium hydroxide (MILK OF MAGNESIA) suspension 30 mL     naloxone (NARCAN) injection 0.2 mg    Or     naloxone (NARCAN) injection 0.4 mg    Or     naloxone (NARCAN) injection 0.2 mg    Or     naloxone (NARCAN) injection 0.4 mg     nitroGLYcerin (NITROSTAT) sublingual tablet 0.4 mg     ondansetron (ZOFRAN ODT) ODT tab 4 mg    Or     ondansetron (ZOFRAN) injection 4 mg     oxyCODONE (ROXICODONE) tablet 10 mg     oxyCODONE IR (ROXICODONE) half-tab 2.5-5 mg    Or     oxyCODONE (ROXICODONE) tablet 10 mg     oxyCODONE (ROXICODONE) tablet 5 mg     polyethylene glycol (MIRALAX) Packet 17 g     prochlorperazine (COMPAZINE) injection 5 mg    Or     prochlorperazine  (COMPAZINE) tablet 5 mg     senna-docusate (SENOKOT-S/PERICOLACE) 8.6-50 MG per tablet 1 tablet     sodium chloride (PF) 0.9% PF flush 3 mL     sodium chloride (PF) 0.9% PF flush 3 mL       Pertinent Radiology   Radiology Results: Personally reviewed   Results for orders placed or performed during the hospital encounter of 02/14/23   XR Knee Port Left 1/2 Views    Impression    IMPRESSION: TKA with patellar resurfacing. Postoperative air is present. Findings are new in the interval. Components are well seated. No fractures are identified.       I spent a total of 42 minutes for this encounter with interval history, physical exam, in patient medication review and reconciliation.      Juventino Velez DO  Internal Medicine Hospitalist  2/15/2023

## 2023-02-15 NOTE — PROGRESS NOTES
Patient discharging home via family. After visit summary reviewed and questions answered. Belongings returned.

## 2023-02-15 NOTE — PROGRESS NOTES
PRIMARY DIAGNOSIS: ACUTE PAIN  OUTPATIENT/OBSERVATION GOALS TO BE MET BEFORE DISCHARGE:  1. Pain Status: Improved-controlled with oral pain medications.    2. Return to near baseline physical activity: No    3. Cleared for discharge by consultants (if involved): No    Discharge Planner Nurse   Safe discharge environment identified: Yes  Barriers to discharge: Yes       Entered by: Tiffanie Viera RN 02/14/2023 9:12 PM     Pt is a/o x4. Pt rated pain 4/10 of left knee. Nurse educated not to have pillow under the knee. Pt verbally states she understands and refused to removed the pillow and if was to removed she will continue to have the pillow under her knee. Pt stated she was not ready to go on her 5 ft walk due to the pain. Pt agreed to do it after taking the tylenol.  Pt was able to void and ambulate from bathroom to bed.   Continue POC.

## 2023-02-15 NOTE — PROGRESS NOTES
Orthopedic Progress Note      Assessment: 1 Day Post-Op  S/P Procedure(s):  LEFT TOTAL KNEE ARTHROPLASTY     Plan:   - Continue PT/OT.   - Weightbearing status: WBAT.  - pain control  - Continue ace wrap around knee from 8am-8pm  - Anticoagulation: ASA in addition to SCDs, daniel stockings and early ambulation.  - Discharge planning: Discharge to home today.     Subjective:  Pain: Well controlled on Tylenol & oxycodone.  Nausea, Vomiting:  No  Chest pain: No  Lightheadedness, Dizziness:  No  Neuro:  Patient denies new onset numbness or paresthesias in left lower extremity     Patient is doing well on POD #1. Ambulating, tolerating oral intake, voiding & pain is controlled with oral medication. Ready for discharge. Hgb 11.3 (pre-op 12.4).     Objective:  BP (!) 153/79 (BP Location: Right arm, Patient Position: Sitting)   Pulse 80   Temp 97.8  F (36.6  C) (Oral)   Resp 16   Ht 1.524 m (5')   Wt 55.3 kg (122 lb)   LMP  (LMP Unknown)   SpO2 98%   BMI 24.41 kg/m    The patient is A&Ox3. Appears comfortable, sitting up at bedside.  Sensation is intact to light touch & equal bilaterally in the L2 through S1 dermatomes.  Calves are soft and non-tender.  Dorsiflexion and plantar flexion is intact bilaterally.  Appropriate flexion and extension of the toes bilaterally.   Brisk capillary refill in the toes bilaterally.   Palpable left  dorsalis pedis pulse.  Left  knee dressing C/D/I.       Pertinent Labs   Lab Results: personally reviewed.   Lab Results   Component Value Date    INR 0.99 02/14/2023    INR 1.05 10/18/2013    INR 0.92 10/17/2013     Lab Results   Component Value Date    WBC 12.3 (H) 02/14/2023    HGB 11.3 (L) 02/15/2023    HCT 37.4 02/14/2023    MCV 93 02/14/2023     02/14/2023     Lab Results   Component Value Date     05/05/2022    CO2 26 05/05/2022         Report completed by:  Nishi Hyman PA-C/[facility]    Date: 2/15/2023  Time: 9:04 AM

## 2023-02-17 LAB — BACTERIA SNV CULT: NO GROWTH

## 2023-05-16 DIAGNOSIS — E78.5 HLD (HYPERLIPIDEMIA): Primary | ICD-10-CM

## 2023-05-16 RX ORDER — ATORVASTATIN CALCIUM 40 MG/1
40 TABLET, FILM COATED ORAL AT BEDTIME
Qty: 90 TABLET | Refills: 3 | Status: SHIPPED | OUTPATIENT
Start: 2023-05-16 | End: 2024-03-12

## 2023-05-17 DIAGNOSIS — I10 BENIGN ESSENTIAL HYPERTENSION: ICD-10-CM

## 2023-05-17 RX ORDER — LOSARTAN POTASSIUM 25 MG/1
25 TABLET ORAL 2 TIMES DAILY
Qty: 180 TABLET | Refills: 3 | Status: SHIPPED | OUTPATIENT
Start: 2023-05-17 | End: 2024-03-12

## 2023-05-17 NOTE — TELEPHONE ENCOUNTER
Received refill request from [facility] pharmacy for Losartan 25mg tab twice daily.    Last OV with Dr. Doll, Feb 2023    [facility] Refill Guideline reviewed.  Medication meets criteria for refill.

## 2024-02-21 ENCOUNTER — TELEPHONE (OUTPATIENT)
Dept: CARDIOLOGY | Facility: CLINIC | Age: 79
End: 2024-02-21
Payer: COMMERCIAL

## 2024-02-21 DIAGNOSIS — I10 BENIGN ESSENTIAL HYPERTENSION: ICD-10-CM

## 2024-02-21 RX ORDER — METOPROLOL TARTRATE 50 MG
25 TABLET ORAL 2 TIMES DAILY
Qty: 90 TABLET | Refills: 0 | Status: SHIPPED | OUTPATIENT
Start: 2024-02-21 | End: 2024-03-12

## 2024-02-21 NOTE — TELEPHONE ENCOUNTER
Health Call Center    Phone Message    May a detailed message be left on voicemail: yes     Reason for Call: Medication Refill Request    Has the patient contacted the pharmacy for the refill? Yes   Name of medication being requested: metoprolol tartrate (LOPRESSOR) 50 MG tablet   Provider who prescribed the medication: Smith   Pharmacy: [facility] [address] [place], [place] [id number] [address] [place] [address]    Date medication is needed: 2 left      Action Taken: Other: cardiology     Travel Screening: Not Applicable    Thank you!  [facility]

## 2024-02-29 DIAGNOSIS — E78.2 MIXED HYPERLIPIDEMIA: Primary | ICD-10-CM

## 2024-03-11 ENCOUNTER — LAB (OUTPATIENT)
Dept: LAB | Facility: CLINIC | Age: 79
End: 2024-03-11
Attending: NURSE PRACTITIONER
Payer: COMMERCIAL

## 2024-03-11 DIAGNOSIS — E78.2 MIXED HYPERLIPIDEMIA: ICD-10-CM

## 2024-03-11 DIAGNOSIS — I10 BENIGN ESSENTIAL HYPERTENSION: ICD-10-CM

## 2024-03-11 LAB
ALT SERPL W P-5'-P-CCNC: 16 U/L (ref 0–50)
CHOLEST SERPL-MCNC: 147 MG/DL
FASTING STATUS PATIENT QL REPORTED: YES
HDLC SERPL-MCNC: 71 MG/DL
LDLC SERPL CALC-MCNC: 63 MG/DL
NONHDLC SERPL-MCNC: 76 MG/DL
TRIGL SERPL-MCNC: 67 MG/DL

## 2024-03-11 PROCEDURE — 80048 BASIC METABOLIC PNL TOTAL CA: CPT | Performed by: NURSE PRACTITIONER

## 2024-03-11 PROCEDURE — 36415 COLL VENOUS BLD VENIPUNCTURE: CPT | Performed by: NURSE PRACTITIONER

## 2024-03-11 PROCEDURE — 84460 ALANINE AMINO (ALT) (SGPT): CPT | Performed by: NURSE PRACTITIONER

## 2024-03-11 PROCEDURE — 80061 LIPID PANEL: CPT | Performed by: NURSE PRACTITIONER

## 2024-03-11 NOTE — PROGRESS NOTES
HISTORY OF PRESENT ILLNESS:    This is a 78 year old female who follows with Dr. Guevara at [facility]   Her past medical history includes:  Coronary artery disease, hypertension, palpitations, and hyperlipidemia:     Ms Beckham suffered a NSTEMI (2013) and underwent RCA stenting.  She has remaining moderate proximal LAD and mid CFX disease.  ECHO showed LVEF 55-60% with inferolateral hypokinesis, normal RV function, and no significant valvular pathology.  A follow up stress NUC (2013) did not show any ischemia/infarction      Holter monitor (2015) showed sinus rhythm with a 2% PVC burden.     A Karrie NUC stress test (2021) did not show any stress-induced ischemia/infarction  LVEF 70%     Due to ongoing atypical chest pain, a Karrie NUC stress test (3/2022) showed no evidence of stress-induced ischemia/infarction  LVEF 82%  Small LV cavity size      3-day ZioPatch monitor (3/2022) showed sinus rhythm with an average HR of 69 bpm  Rare PACs, PVCs.    She returns today for annual assessment and labs     Ms Beckham activity is limited somewhat due to knee issues  She walks with a cane  She denies any chest pain, significant shortness of breath, palpitations, orthopnea, or peripheral edema  Her energy is good and she sleeps well          VITAL SIGNS:  BP:  130/64  Pulse:   64  Weight:  120 lbs  (BMI 23)    Labs (3/11/24)  total cholesterol: 147  Triglycerides: 67  HDL: 71  LDL: 63     IMPRESSION AND PLAN:     Coronary Artery Disease:  -s/p NSTEMI and stenting to RCA (2013)  -known remaining moderate proximal LAD and mid CFX disease  -stress NUC  (2022) showed no ischemia/infarct  LVEF 70%  -denies angina        Hypertension:  -on Metoprolol 25 mg BID, Losartan 25 mg BID  -BP controlled  -will add on BMP to previous drawn labs and review through MyChart     Hyperlipidemia:  -on Atorvastatin 40 mg  -LDL  63    The total time for the visit today was 25 minutes which includes patient visit, reviewing of records, discussion,  and placing of orders of the outpatient coordination of cardiovascular care as described.  The level of medical decision making during this visit was of mild complexity.  Thank you for allowing me to participate in their care.    The longitudinal plan of care for hypertension as documented were addressed during this visit. Due to the added complexity in care, I will continue to support Malinda in the subsequent management and with ongoing continuity of care.      Orders Placed This Encounter   Procedures    Basic metabolic panel    Follow-Up with Cardiology       Orders Placed This Encounter   Medications    potassium gluconate 2.5 MEQ tablet     Sig: Take 2.5 mEq by mouth    atorvastatin (LIPITOR) 40 MG tablet     Sig: Take 1 tablet (40 mg) by mouth at bedtime     Dispense:  90 tablet     Refill:  3    losartan (COZAAR) 25 MG tablet     Sig: Take 1 tablet (25 mg) by mouth 2 times daily     Dispense:  180 tablet     Refill:  3    metoprolol tartrate (LOPRESSOR) 50 MG tablet     Sig: Take 0.5 tablets (25 mg) by mouth 2 times daily     Dispense:  90 tablet     Refill:  3       Medications Discontinued During This Encounter   Medication Reason    acetaminophen (TYLENOL) 325 MG tablet Not filled/taken by Patient (No AVS)    senna-docusate (SENOKOT-S/PERICOLACE) 8.6-50 MG tablet Not filled/taken by Patient (No AVS)    atorvastatin (LIPITOR) 40 MG tablet Reorder (No AVS)    losartan (COZAAR) 25 MG tablet Reorder (No AVS)    metoprolol tartrate (LOPRESSOR) 50 MG tablet Reorder (No AVS)         Encounter Diagnoses   Name Primary?    Hyperlipidemia LDL goal <70     Benign essential hypertension     Coronary artery disease involving native coronary artery of native heart without angina pectoris Yes       CURRENT MEDICATIONS:  Current Outpatient Medications   Medication Sig Dispense Refill    aspirin 81 MG EC tablet Take 1 tablet (81 mg) by mouth 2 times daily 60 tablet 0    atorvastatin (LIPITOR) 40 MG tablet Take 1 tablet (40  mg) by mouth at bedtime 90 tablet 3    Biotin 1 MG CAPS Take 1 capsule by mouth 2 times daily      calcium carbonate-vitamin D (CALTRATE) 600-10 MG-MCG per tablet Take 1 tablet by mouth daily      cyanocobalamin 1000 MCG SUBL Place 1,000 mcg under the tongue daily      cycloSPORINE (RESTASIS) 0.05 % ophthalmic emulsion Place 1 drop into both eyes 2 times daily      gabapentin (NEURONTIN) 300 MG capsule Take one capsule by mouth at bedtime 30 capsule 0    losartan (COZAAR) 25 MG tablet Take 1 tablet (25 mg) by mouth 2 times daily 180 tablet 3    MAGNESIUM PO Take 1 tablet by mouth daily      melatonin 5 MG tablet Take 5 mg by mouth every evening      metoprolol tartrate (LOPRESSOR) 50 MG tablet Take 0.5 tablets (25 mg) by mouth 2 times daily 90 tablet 3    nitroglycerin (NITROSTAT) 0.4 MG SL tablet Place 1 tablet (0.4 mg) under the tongue every 5 minutes as needed for chest pain 25 tablet 0    potassium gluconate 2.5 MEQ tablet Take 2.5 mEq by mouth      vitamin C (ASCORBIC ACID) 500 MG tablet Take 500 mg by mouth daily      vitamin E 400 units TABS Take 400 Units by mouth daily      celecoxib (CELEBREX) 200 MG capsule Take 1 capsule (200 mg) by mouth daily for 14 days Do not take within 6 hours of ibuprofen (MOTRIN, ADVIL) or ketorolac (TORADOL) if prescribed. 14 capsule 0    oxyCODONE (ROXICODONE) 5 MG tablet Take 1 tablet (5 mg) by mouth every 4 hours as needed for moderate to severe pain (MDD 4 tabs) (Patient not taking: Reported on 3/12/2024) 26 tablet 0       ALLERGIES     Allergies   Allergen Reactions    Lisinopril Cough     adverse reaction       PAST MEDICAL HISTORY:  Past Medical History:   Diagnosis Date    Antiplatelet or antithrombotic long-term use     Coronary artery disease     stent 2013    Coronary artery disease involving native coronary artery of native heart without angina pectoris 12/19/2016    Essential hypertension 12/19/2016    Myocardial infarction (H)     NSTEMI 2013    NSTEMI (non-ST  elevated myocardial infarction) (H) 10/18/2013    Osteoarthritis of knee     Pure hypercholesterolemia     S/P right coronary artery (RCA) stent placement 2017    Stented coronary artery        PAST SURGICAL HISTORY:  Past Surgical History:   Procedure Laterality Date    ARTHROPLASTY KNEE Left 2023    Procedure: LEFT TOTAL KNEE ARTHROPLASTY;  Surgeon: Alexis Green MD;  Location: [facility] OR    CATARACT EXTRACTION Right     CORONARY STENT PLACEMENT      HC LEFT HEART CATHETERIZATION  10/18/13    Stent to RCA    lens implant surgery      ZZC TOTAL KNEE ARTHROPLASTY Right 3/2/2018    Procedure: RIGHT TOTAL KNEE ARTHROPLASTY;  Surgeon: Vinnie De Santiago MD;  Location: [facility] OR;  Service: Orthopedics       FAMILY HISTORY:  Family History   Problem Relation Age of Onset    Cerebrovascular Disease Mother 79        possible MI    Hypertension Mother     C.A.D. Father 59        MI    C.A.D. Brother         2 stents    C.A.D. Other         MI    Cancer Other 69        lung       SOCIAL HISTORY:  Social History     Socioeconomic History    Marital status:      Spouse name: None    Number of children: None    Years of education: None    Highest education level: None   Tobacco Use    Smoking status: Former     Types: Cigarettes     Quit date:      Years since quittin.2    Smokeless tobacco: Never   Vaping Use    Vaping Use: Never used   Substance and Sexual Activity    Alcohol use: Yes     Comment: 1-2 a day    Drug use: No   Other Topics Concern    Caffeine Concern No     Comment: 1-2  mug mornings    Sleep Concern No     Comment: does not sleep well    Special Diet No    Exercise No     Comment: due to knees    Seat Belt Yes       Review of Systems:  Skin:          Eyes:         ENT:         Respiratory:  Negative       Cardiovascular:  Negative      Gastroenterology:        Genitourinary:         Musculoskeletal:         Neurologic:         Psychiatric:          Heme/Lymph/Imm:         Endocrine:           Physical Exam:  Vitals: /64 (BP Location: Right arm, Patient Position: Sitting, Cuff Size: Adult Regular)   Pulse 64   Ht 1.524 m (5')   Wt 54.4 kg (120 lb)   LMP  (LMP Unknown)   SpO2 97%   BMI 23.44 kg/m      Constitutional:  cooperative        Skin:  warm and dry to the touch          Head:  normocephalic        Eyes:  pupils equal and round        Lymph:      ENT:  no pallor or cyanosis        Neck:  JVP normal;no carotid bruit        Respiratory:  clear to auscultation;normal respiratory excursion    fewe rales in left base otherwise clear to auscultation    Cardiac: regular rhythm;normal S1 and S2   S4   systolic ejection murmur;grade 1;LLSB;radiation to the RUSB        pulses full and equal                                        GI:  abdomen soft        Extremities and Muscular Skeletal:  no edema arthritic deformities of LE            Neurological:  affect appropriate   walks with a cane    Psych:  Alert and Oriented x 3          CC  No referring provider defined for this encounter.

## 2024-03-12 ENCOUNTER — OFFICE VISIT (OUTPATIENT)
Dept: CARDIOLOGY | Facility: CLINIC | Age: 79
End: 2024-03-12
Payer: COMMERCIAL

## 2024-03-12 VITALS
BODY MASS INDEX: 23.56 KG/M2 | OXYGEN SATURATION: 97 % | HEIGHT: 60 IN | HEART RATE: 64 BPM | WEIGHT: 120 LBS | DIASTOLIC BLOOD PRESSURE: 64 MMHG | SYSTOLIC BLOOD PRESSURE: 130 MMHG

## 2024-03-12 DIAGNOSIS — I25.10 CORONARY ARTERY DISEASE INVOLVING NATIVE CORONARY ARTERY OF NATIVE HEART WITHOUT ANGINA PECTORIS: Primary | ICD-10-CM

## 2024-03-12 DIAGNOSIS — I10 BENIGN ESSENTIAL HYPERTENSION: ICD-10-CM

## 2024-03-12 DIAGNOSIS — E78.5 HYPERLIPIDEMIA LDL GOAL <70: ICD-10-CM

## 2024-03-12 LAB
ANION GAP SERPL CALCULATED.3IONS-SCNC: 15 MMOL/L (ref 7–15)
BUN SERPL-MCNC: 15.6 MG/DL (ref 8–23)
CALCIUM SERPL-MCNC: 9.3 MG/DL (ref 8.8–10.2)
CHLORIDE SERPL-SCNC: 104 MMOL/L (ref 98–107)
CREAT SERPL-MCNC: 0.87 MG/DL (ref 0.51–0.95)
DEPRECATED HCO3 PLAS-SCNC: 19 MMOL/L (ref 22–29)
EGFRCR SERPLBLD CKD-EPI 2021: 67 ML/MIN/1.73M2
GLUCOSE SERPL-MCNC: 100 MG/DL (ref 70–99)
POTASSIUM SERPL-SCNC: 4.5 MMOL/L (ref 3.4–5.3)
SODIUM SERPL-SCNC: 138 MMOL/L (ref 135–145)

## 2024-03-12 PROCEDURE — 99213 OFFICE O/P EST LOW 20 MIN: CPT | Performed by: NURSE PRACTITIONER

## 2024-03-12 RX ORDER — PLANT STANOL ESTER 450 MG
2.5 TABLET ORAL
COMMUNITY

## 2024-03-12 RX ORDER — METOPROLOL TARTRATE 50 MG
25 TABLET ORAL 2 TIMES DAILY
Qty: 90 TABLET | Refills: 3 | Status: SHIPPED | OUTPATIENT
Start: 2024-03-12

## 2024-03-12 RX ORDER — LOSARTAN POTASSIUM 25 MG/1
25 TABLET ORAL 2 TIMES DAILY
Qty: 180 TABLET | Refills: 3 | Status: SHIPPED | OUTPATIENT
Start: 2024-03-12

## 2024-03-12 RX ORDER — ATORVASTATIN CALCIUM 40 MG/1
40 TABLET, FILM COATED ORAL AT BEDTIME
Qty: 90 TABLET | Refills: 3 | Status: SHIPPED | OUTPATIENT
Start: 2024-03-12

## 2024-03-12 NOTE — LETTER
3/12/2024    [place]  [address]  [facility] [address]    RE: Malinda Beckham       Dear Colleague,     I had the pleasure of seeing Malinda Beckham in the [facility].  HISTORY OF PRESENT ILLNESS:    This is a 78 year old female who follows with Dr. Guevara at [facility]   Her past medical history includes:  Coronary artery disease, hypertension, palpitations, and hyperlipidemia:     Ms Beckham suffered a NSTEMI (2013) and underwent RCA stenting.  She has remaining moderate proximal LAD and mid CFX disease.  ECHO showed LVEF 55-60% with inferolateral hypokinesis, normal RV function, and no significant valvular pathology.  A follow up stress NUC (2013) did not show any ischemia/infarction      Holter monitor (2015) showed sinus rhythm with a 2% PVC burden.     A Karrie NUC stress test (2021) did not show any stress-induced ischemia/infarction  LVEF 70%     Due to ongoing atypical chest pain, a Karrie NUC stress test (3/2022) showed no evidence of stress-induced ischemia/infarction  LVEF 82%  Small LV cavity size      3-day ZioPatch monitor (3/2022) showed sinus rhythm with an average HR of 69 bpm  Rare PACs, PVCs.    She returns today for annual assessment and labs     Ms Beckham activity is limited somewhat due to knee issues  She walks with a cane  She denies any chest pain, significant shortness of breath, palpitations, orthopnea, or peripheral edema  Her energy is good and she sleeps well          VITAL SIGNS:  BP:  130/64  Pulse:   64  Weight:  120 lbs  (BMI 23)    Labs (3/11/24)  total cholesterol: 147  Triglycerides: 67  HDL: 71  LDL: 63     IMPRESSION AND PLAN:     Coronary Artery Disease:  -s/p NSTEMI and stenting to RCA (2013)  -known remaining moderate proximal LAD and mid CFX disease  -stress NUC  (2022) showed no ischemia/infarct  LVEF 70%  -denies angina        Hypertension:  -on Metoprolol 25 mg BID, Losartan 25 mg BID  -BP controlled  -will add on BMP to previous  drawn labs and review through [facility]     Hyperlipidemia:  -on Atorvastatin 40 mg  -LDL  63    The total time for the visit today was 25 minutes which includes patient visit, reviewing of records, discussion, and placing of orders of the outpatient coordination of cardiovascular care as described.  The level of medical decision making during this visit was of mild complexity.  Thank you for allowing me to participate in their care.    The longitudinal plan of care for hypertension as documented were addressed during this visit. Due to the added complexity in care, I will continue to support Malinda in the subsequent management and with ongoing continuity of care.      Orders Placed This Encounter   Procedures    Basic metabolic panel    Follow-Up with Cardiology       Orders Placed This Encounter   Medications    potassium gluconate 2.5 MEQ tablet     Sig: Take 2.5 mEq by mouth    atorvastatin (LIPITOR) 40 MG tablet     Sig: Take 1 tablet (40 mg) by mouth at bedtime     Dispense:  90 tablet     Refill:  3    losartan (COZAAR) 25 MG tablet     Sig: Take 1 tablet (25 mg) by mouth 2 times daily     Dispense:  180 tablet     Refill:  3    metoprolol tartrate (LOPRESSOR) 50 MG tablet     Sig: Take 0.5 tablets (25 mg) by mouth 2 times daily     Dispense:  90 tablet     Refill:  3       Medications Discontinued During This Encounter   Medication Reason    acetaminophen (TYLENOL) 325 MG tablet Not filled/taken by Patient (No AVS)    senna-docusate (SENOKOT-S/PERICOLACE) 8.6-50 MG tablet Not filled/taken by Patient (No AVS)    atorvastatin (LIPITOR) 40 MG tablet Reorder (No AVS)    losartan (COZAAR) 25 MG tablet Reorder (No AVS)    metoprolol tartrate (LOPRESSOR) 50 MG tablet Reorder (No AVS)         Encounter Diagnoses   Name Primary?    Hyperlipidemia LDL goal <70     Benign essential hypertension     Coronary artery disease involving native coronary artery of native heart without angina pectoris Yes       CURRENT  MEDICATIONS:  Current Outpatient Medications   Medication Sig Dispense Refill    aspirin 81 MG EC tablet Take 1 tablet (81 mg) by mouth 2 times daily 60 tablet 0    atorvastatin (LIPITOR) 40 MG tablet Take 1 tablet (40 mg) by mouth at bedtime 90 tablet 3    Biotin 1 MG CAPS Take 1 capsule by mouth 2 times daily      calcium carbonate-vitamin D (CALTRATE) 600-10 MG-MCG per tablet Take 1 tablet by mouth daily      cyanocobalamin 1000 MCG SUBL Place 1,000 mcg under the tongue daily      cycloSPORINE (RESTASIS) 0.05 % ophthalmic emulsion Place 1 drop into both eyes 2 times daily      gabapentin (NEURONTIN) 300 MG capsule Take one capsule by mouth at bedtime 30 capsule 0    losartan (COZAAR) 25 MG tablet Take 1 tablet (25 mg) by mouth 2 times daily 180 tablet 3    MAGNESIUM PO Take 1 tablet by mouth daily      melatonin 5 MG tablet Take 5 mg by mouth every evening      metoprolol tartrate (LOPRESSOR) 50 MG tablet Take 0.5 tablets (25 mg) by mouth 2 times daily 90 tablet 3    nitroglycerin (NITROSTAT) 0.4 MG SL tablet Place 1 tablet (0.4 mg) under the tongue every 5 minutes as needed for chest pain 25 tablet 0    potassium gluconate 2.5 MEQ tablet Take 2.5 mEq by mouth      vitamin C (ASCORBIC ACID) 500 MG tablet Take 500 mg by mouth daily      vitamin E 400 units TABS Take 400 Units by mouth daily      celecoxib (CELEBREX) 200 MG capsule Take 1 capsule (200 mg) by mouth daily for 14 days Do not take within 6 hours of ibuprofen (MOTRIN, ADVIL) or ketorolac (TORADOL) if prescribed. 14 capsule 0    oxyCODONE (ROXICODONE) 5 MG tablet Take 1 tablet (5 mg) by mouth every 4 hours as needed for moderate to severe pain (MDD 4 tabs) (Patient not taking: Reported on 3/12/2024) 26 tablet 0       ALLERGIES     Allergies   Allergen Reactions    Lisinopril Cough     adverse reaction       PAST MEDICAL HISTORY:  Past Medical History:   Diagnosis Date    Antiplatelet or antithrombotic long-term use     Coronary artery disease     stent      Coronary artery disease involving native coronary artery of native heart without angina pectoris 2016    Essential hypertension 2016    Myocardial infarction (H)     NSTEMI     NSTEMI (non-ST elevated myocardial infarction) (H) 10/18/2013    Osteoarthritis of knee     Pure hypercholesterolemia     S/P right coronary artery (RCA) stent placement 2017    Stented coronary artery        PAST SURGICAL HISTORY:  Past Surgical History:   Procedure Laterality Date    ARTHROPLASTY KNEE Left 2023    Procedure: LEFT TOTAL KNEE ARTHROPLASTY;  Surgeon: Alexis Green MD;  Location: [facility] Main OR    CATARACT EXTRACTION Right     CORONARY STENT PLACEMENT       LEFT HEART CATHETERIZATION  10/18/13    Stent to RCA    lens implant surgery      ZZC TOTAL KNEE ARTHROPLASTY Right 3/2/2018    Procedure: RIGHT TOTAL KNEE ARTHROPLASTY;  Surgeon: Vinnie De Santiago MD;  Location: [facility] Main OR;  Service: Orthopedics       FAMILY HISTORY:  Family History   Problem Relation Age of Onset    Cerebrovascular Disease Mother 79        possible MI    Hypertension Mother     C.A.D. Father 59        MI    C.A.D. Brother         2 stents    C.A.D. Other         MI    Cancer Other 69        lung       SOCIAL HISTORY:  Social History     Socioeconomic History    Marital status:      Spouse name: None    Number of children: None    Years of education: None    Highest education level: None   Tobacco Use    Smoking status: Former     Types: Cigarettes     Quit date: 1967     Years since quittin.2    Smokeless tobacco: Never   Vaping Use    Vaping Use: Never used   Substance and Sexual Activity    Alcohol use: Yes     Comment: 1-2 a day    Drug use: No   Other Topics Concern    Caffeine Concern No     Comment: 1-2  mug mornings    Sleep Concern No     Comment: does not sleep well    Special Diet No    Exercise No     Comment: due to knees    Seat Belt Yes       Review of Systems:  Skin:           Eyes:         ENT:         Respiratory:  Negative       Cardiovascular:  Negative      Gastroenterology:        Genitourinary:         Musculoskeletal:         Neurologic:         Psychiatric:         Heme/Lymph/Imm:         Endocrine:           Physical Exam:  Vitals: /64 (BP Location: Right arm, Patient Position: Sitting, Cuff Size: Adult Regular)   Pulse 64   Ht 1.524 m (5')   Wt 54.4 kg (120 lb)   LMP  (LMP Unknown)   SpO2 97%   BMI 23.44 kg/m      Constitutional:  cooperative        Skin:  warm and dry to the touch          Head:  normocephalic        Eyes:  pupils equal and round        Lymph:      ENT:  no pallor or cyanosis        Neck:  JVP normal;no carotid bruit        Respiratory:  clear to auscultation;normal respiratory excursion    fewe rales in left base otherwise clear to auscultation    Cardiac: regular rhythm;normal S1 and S2   S4   systolic ejection murmur;grade 1;LLSB;radiation to the RUSB        pulses full and equal                                        GI:  abdomen soft        Extremities and Muscular Skeletal:  no edema arthritic deformities of LE            Neurological:  affect appropriate   walks with a cane    Psych:  Alert and Oriented x 3          CC  No referring provider defined for this encounter.      Thank you for allowing me to participate in the care of your patient.      Sincerely,     MARYJO Echeverria [facility]

## 2024-03-12 NOTE — PATIENT INSTRUCTIONS
Continue current medications  Return next year    It was a pleasure seeing you today     Please do not hesitate to call my nurse team with any questions or concerns:  [phone]    Scheduling number:  [phone]    MARYJO Bruce, CNP

## 2025-03-31 ENCOUNTER — TELEPHONE (OUTPATIENT)
Dept: CARDIOLOGY | Facility: CLINIC | Age: 80
End: 2025-03-31
Payer: COMMERCIAL

## 2025-03-31 DIAGNOSIS — I10 BENIGN ESSENTIAL HYPERTENSION: ICD-10-CM

## 2025-03-31 RX ORDER — LOSARTAN POTASSIUM 25 MG/1
25 TABLET ORAL 2 TIMES DAILY
Qty: 180 TABLET | Refills: 0 | Status: SHIPPED | OUTPATIENT
Start: 2025-03-31

## 2025-04-24 DIAGNOSIS — E78.5 HYPERLIPIDEMIA LDL GOAL <70: ICD-10-CM

## 2025-04-24 RX ORDER — ATORVASTATIN CALCIUM 40 MG/1
40 TABLET, FILM COATED ORAL AT BEDTIME
Qty: 90 TABLET | Refills: 0 | Status: SHIPPED | OUTPATIENT
Start: 2025-04-24

## 2025-05-18 DIAGNOSIS — I10 BENIGN ESSENTIAL HYPERTENSION: ICD-10-CM

## 2025-05-19 RX ORDER — METOPROLOL TARTRATE 50 MG
25 TABLET ORAL 2 TIMES DAILY
Qty: 90 TABLET | Refills: 0 | Status: SHIPPED | OUTPATIENT
Start: 2025-05-19

## 2025-06-16 ENCOUNTER — TELEPHONE (OUTPATIENT)
Dept: CARDIOLOGY | Facility: CLINIC | Age: 80
End: 2025-06-16
Payer: COMMERCIAL

## 2025-06-16 DIAGNOSIS — R07.89 CHEST DISCOMFORT: ICD-10-CM

## 2025-06-16 RX ORDER — NITROGLYCERIN 0.4 MG/1
0.4 TABLET SUBLINGUAL EVERY 5 MIN PRN
Qty: 25 TABLET | Refills: 0 | Status: SHIPPED | OUTPATIENT
Start: 2025-06-16

## 2025-06-16 NOTE — TELEPHONE ENCOUNTER
[facility] Call Center    Phone Message    May a detailed message be left on voicemail: yes     Reason for Call: Medication Refill Request    Has the patient contacted the pharmacy for the refill? Yes   Name of medication being requested:   nitroglycerin (NITROSTAT) 0.4 MG SL tablet   Provider who prescribed the medication: PCP - patient requesting Cardio to refill if possible. Has appt scheduled for 9/30  Pharmacy: [address]    Date medication is needed: Has about 6 tablets left       Action Taken: Message routed to:  Other: Cardio    Travel Screening: Not Applicable     Date of Service:                                      Thank you!  [facility]

## 2025-07-21 DIAGNOSIS — I10 BENIGN ESSENTIAL HYPERTENSION: ICD-10-CM

## 2025-07-22 RX ORDER — LOSARTAN POTASSIUM 25 MG/1
TABLET ORAL
Qty: 180 TABLET | Refills: 0 | Status: SHIPPED | OUTPATIENT
Start: 2025-07-22

## 2025-09-02 DIAGNOSIS — I10 BENIGN ESSENTIAL HYPERTENSION: ICD-10-CM

## 2025-09-02 RX ORDER — METOPROLOL TARTRATE 50 MG
TABLET ORAL
Qty: 90 TABLET | Refills: 0 | Status: SHIPPED | OUTPATIENT
Start: 2025-09-02

## (undated) DEVICE — P.F.C. SIGMA TIBIAL INSERT FIXED BEARING STABILIZED 2.5 15MM XLK
Type: IMPLANTABLE DEVICE | Site: KNEE | Status: NON-FUNCTIONAL
Brand: P.F.C. SIGMA

## (undated) DEVICE — GOWN IMPERVIOUS BREATHABLE 2XL/XLONG

## (undated) DEVICE — GLOVE BIOGEL PI SZ 8.5 40885

## (undated) DEVICE — SOL NACL 0.9% IRRIG 1000ML BOTTLE 2F7124

## (undated) DEVICE — CUSTOM PACK TOTAL KNEE SOP5BTKHEC

## (undated) DEVICE — BLADE SAW SAGITTAL STRK WIDE 25.4X85X1.2MM 2108-151-000

## (undated) DEVICE — SOL NACL 0.9% INJ 1000ML BAG 2B1324X

## (undated) DEVICE — DECANTER VIAL 2006S

## (undated) DEVICE — PLATE GROUNDING ADULT W/CORD 9165L

## (undated) DEVICE — SU STRATAFIX PDS PLUS 1 CT-1 18" SXPP1A404

## (undated) DEVICE — BLADE SAW SAGITTAL STRK 12.5X81.5X1.27MM 4/2000 2108-158-000

## (undated) DEVICE — GLOVE BIOGEL PI ULTRATOUCH G SZ 7.5 42175

## (undated) DEVICE — ADH SKIN CLOSURE PREMIERPRO EXOFIN 1.0ML 3470

## (undated) DEVICE — HOLDER LIMB VELCRO OR 0814-1533

## (undated) DEVICE — NEEDLE SPINAL DISP 18GA X 3.5" QUINCKE 333350

## (undated) DEVICE — CUSTOM PACK TOTAL KNEE ACCESSORY SOP5BTAHEA

## (undated) DEVICE — DRESSING MEPILEX BORDER POST-OP 4X10

## (undated) DEVICE — SUCTION MANIFOLD NEPTUNE 2 SYS 4 PORT 0702-020-000

## (undated) DEVICE — A3 SUPPLIES- SEE NURSING INFO PAGE

## (undated) DEVICE — SOL WATER IRRIG 1000ML BOTTLE 2F7114

## (undated) DEVICE — GLOVE UNDER INDICATOR PI SZ 8.5 LF 41685

## (undated) DEVICE — SUTURE VICRYL+ 2-0 27IN CT-1 UND VCP259H

## (undated) DEVICE — SU FIBERWIRE 2 38" T-8 NDL  AR-7206

## (undated) DEVICE — SU MONOCRYL 3-0 PS-2 18" UND MCP497G

## (undated) DEVICE — GLOVE BIOGEL INDICATOR 7.5 LF 41675

## (undated) DEVICE — BANDAGE ELASTIC 6X550 LF DBL 593-96LF

## (undated) RX ORDER — PROPOFOL 10 MG/ML
INJECTION, EMULSION INTRAVENOUS
Status: DISPENSED
Start: 2023-02-14

## (undated) RX ORDER — LIDOCAINE HYDROCHLORIDE 10 MG/ML
INJECTION, SOLUTION EPIDURAL; INFILTRATION; INTRACAUDAL; PERINEURAL
Status: DISPENSED
Start: 2023-02-14

## (undated) RX ORDER — ONDANSETRON 2 MG/ML
INJECTION INTRAMUSCULAR; INTRAVENOUS
Status: DISPENSED
Start: 2023-02-14

## (undated) RX ORDER — FENTANYL CITRATE-0.9 % NACL/PF 10 MCG/ML
PLASTIC BAG, INJECTION (ML) INTRAVENOUS
Status: DISPENSED
Start: 2023-02-14

## (undated) RX ORDER — DEXAMETHASONE SODIUM PHOSPHATE 10 MG/ML
INJECTION, EMULSION INTRAMUSCULAR; INTRAVENOUS
Status: DISPENSED
Start: 2023-02-14

## (undated) RX ORDER — DEXAMETHASONE SODIUM PHOSPHATE 4 MG/ML
INJECTION, SOLUTION INTRA-ARTICULAR; INTRALESIONAL; INTRAMUSCULAR; INTRAVENOUS; SOFT TISSUE
Status: DISPENSED
Start: 2023-02-14